# Patient Record
Sex: FEMALE | Race: WHITE | NOT HISPANIC OR LATINO | URBAN - METROPOLITAN AREA
[De-identification: names, ages, dates, MRNs, and addresses within clinical notes are randomized per-mention and may not be internally consistent; named-entity substitution may affect disease eponyms.]

---

## 2017-03-06 ENCOUNTER — OUTPATIENT (OUTPATIENT)
Dept: OUTPATIENT SERVICES | Facility: HOSPITAL | Age: 67
LOS: 1 days | End: 2017-03-06
Payer: COMMERCIAL

## 2017-03-06 DIAGNOSIS — Z22.321 CARRIER OR SUSPECTED CARRIER OF METHICILLIN SUSCEPTIBLE STAPHYLOCOCCUS AUREUS: ICD-10-CM

## 2017-03-06 PROCEDURE — 87641 MR-STAPH DNA AMP PROBE: CPT

## 2017-03-06 PROCEDURE — C1713: CPT

## 2017-03-06 PROCEDURE — C1889: CPT

## 2017-03-07 LAB
MRSA PCR RESULT.: NEGATIVE — SIGNIFICANT CHANGE UP
S AUREUS DNA NOSE QL NAA+PROBE: NEGATIVE — SIGNIFICANT CHANGE UP

## 2017-03-21 ENCOUNTER — RESULT REVIEW (OUTPATIENT)
Age: 67
End: 2017-03-21

## 2017-03-21 VITALS
HEIGHT: 65.5 IN | WEIGHT: 151.46 LBS | OXYGEN SATURATION: 97 % | TEMPERATURE: 97 F | RESPIRATION RATE: 20 BRPM | DIASTOLIC BLOOD PRESSURE: 72 MMHG | SYSTOLIC BLOOD PRESSURE: 108 MMHG | HEART RATE: 84 BPM

## 2017-03-22 ENCOUNTER — INPATIENT (INPATIENT)
Facility: HOSPITAL | Age: 67
LOS: 1 days | Discharge: HOME | DRG: 470 | End: 2017-03-24
Payer: COMMERCIAL

## 2017-03-22 DIAGNOSIS — M17.12 UNILATERAL PRIMARY OSTEOARTHRITIS, LEFT KNEE: ICD-10-CM

## 2017-03-22 DIAGNOSIS — Z90.89 ACQUIRED ABSENCE OF OTHER ORGANS: Chronic | ICD-10-CM

## 2017-03-22 PROCEDURE — 73560 X-RAY EXAM OF KNEE 1 OR 2: CPT | Mod: 26,LT

## 2017-03-22 RX ORDER — DOCUSATE SODIUM 100 MG
100 CAPSULE ORAL THREE TIMES A DAY
Qty: 0 | Refills: 0 | Status: DISCONTINUED | OUTPATIENT
Start: 2017-03-22 | End: 2017-03-24

## 2017-03-22 RX ORDER — MAGNESIUM HYDROXIDE 400 MG/1
30 TABLET, CHEWABLE ORAL DAILY
Qty: 0 | Refills: 0 | Status: DISCONTINUED | OUTPATIENT
Start: 2017-03-22 | End: 2017-03-24

## 2017-03-22 RX ORDER — KETOROLAC TROMETHAMINE 30 MG/ML
15 SYRINGE (ML) INJECTION EVERY 6 HOURS
Qty: 0 | Refills: 0 | Status: DISCONTINUED | OUTPATIENT
Start: 2017-03-22 | End: 2017-03-24

## 2017-03-22 RX ORDER — ACETAMINOPHEN 500 MG
650 TABLET ORAL EVERY 6 HOURS
Qty: 0 | Refills: 0 | Status: DISCONTINUED | OUTPATIENT
Start: 2017-03-22 | End: 2017-03-24

## 2017-03-22 RX ORDER — MONTELUKAST 4 MG/1
1 TABLET, CHEWABLE ORAL
Qty: 0 | Refills: 0 | COMMUNITY

## 2017-03-22 RX ORDER — CELECOXIB 200 MG/1
200 CAPSULE ORAL
Qty: 0 | Refills: 0 | Status: DISCONTINUED | OUTPATIENT
Start: 2017-03-22 | End: 2017-03-24

## 2017-03-22 RX ORDER — MORPHINE SULFATE 50 MG/1
4 CAPSULE, EXTENDED RELEASE ORAL
Qty: 0 | Refills: 0 | Status: DISCONTINUED | OUTPATIENT
Start: 2017-03-22 | End: 2017-03-22

## 2017-03-22 RX ORDER — OXYCODONE HYDROCHLORIDE 5 MG/1
5 TABLET ORAL EVERY 4 HOURS
Qty: 0 | Refills: 0 | Status: DISCONTINUED | OUTPATIENT
Start: 2017-03-22 | End: 2017-03-24

## 2017-03-22 RX ORDER — PANTOPRAZOLE SODIUM 20 MG/1
40 TABLET, DELAYED RELEASE ORAL DAILY
Qty: 0 | Refills: 0 | Status: DISCONTINUED | OUTPATIENT
Start: 2017-03-22 | End: 2017-03-24

## 2017-03-22 RX ORDER — POLYETHYLENE GLYCOL 3350 17 G/17G
17 POWDER, FOR SOLUTION ORAL DAILY
Qty: 0 | Refills: 0 | Status: DISCONTINUED | OUTPATIENT
Start: 2017-03-22 | End: 2017-03-24

## 2017-03-22 RX ORDER — ASPIRIN/CALCIUM CARB/MAGNESIUM 324 MG
325 TABLET ORAL
Qty: 0 | Refills: 0 | Status: DISCONTINUED | OUTPATIENT
Start: 2017-03-22 | End: 2017-03-24

## 2017-03-22 RX ORDER — SODIUM CHLORIDE 9 MG/ML
1000 INJECTION, SOLUTION INTRAVENOUS
Qty: 0 | Refills: 0 | Status: DISCONTINUED | OUTPATIENT
Start: 2017-03-22 | End: 2017-03-24

## 2017-03-22 RX ORDER — CEFAZOLIN SODIUM 1 G
2000 VIAL (EA) INJECTION EVERY 8 HOURS
Qty: 0 | Refills: 0 | Status: COMPLETED | OUTPATIENT
Start: 2017-03-22 | End: 2017-03-23

## 2017-03-22 RX ORDER — MORPHINE SULFATE 50 MG/1
4 CAPSULE, EXTENDED RELEASE ORAL EVERY 4 HOURS
Qty: 0 | Refills: 0 | Status: DISCONTINUED | OUTPATIENT
Start: 2017-03-22 | End: 2017-03-24

## 2017-03-22 RX ORDER — OMEPRAZOLE 10 MG/1
1 CAPSULE, DELAYED RELEASE ORAL
Qty: 0 | Refills: 0 | COMMUNITY

## 2017-03-22 RX ORDER — OXYCODONE HYDROCHLORIDE 5 MG/1
10 TABLET ORAL EVERY 12 HOURS
Qty: 0 | Refills: 0 | Status: DISCONTINUED | OUTPATIENT
Start: 2017-03-22 | End: 2017-03-24

## 2017-03-22 RX ORDER — OXYCODONE HYDROCHLORIDE 5 MG/1
10 TABLET ORAL EVERY 4 HOURS
Qty: 0 | Refills: 0 | Status: DISCONTINUED | OUTPATIENT
Start: 2017-03-22 | End: 2017-03-24

## 2017-03-22 RX ORDER — BUPIVACAINE 13.3 MG/ML
20 INJECTION, SUSPENSION, LIPOSOMAL INFILTRATION ONCE
Qty: 0 | Refills: 0 | Status: DISCONTINUED | OUTPATIENT
Start: 2017-03-22 | End: 2017-03-24

## 2017-03-22 RX ORDER — SENNA PLUS 8.6 MG/1
2 TABLET ORAL AT BEDTIME
Qty: 0 | Refills: 0 | Status: DISCONTINUED | OUTPATIENT
Start: 2017-03-22 | End: 2017-03-24

## 2017-03-22 RX ORDER — PANTOPRAZOLE SODIUM 20 MG/1
40 TABLET, DELAYED RELEASE ORAL
Qty: 0 | Refills: 0 | Status: DISCONTINUED | OUTPATIENT
Start: 2017-03-22 | End: 2017-03-24

## 2017-03-22 RX ORDER — ONDANSETRON 8 MG/1
4 TABLET, FILM COATED ORAL EVERY 6 HOURS
Qty: 0 | Refills: 0 | Status: DISCONTINUED | OUTPATIENT
Start: 2017-03-22 | End: 2017-03-24

## 2017-03-22 RX ADMIN — OXYCODONE HYDROCHLORIDE 10 MILLIGRAM(S): 5 TABLET ORAL at 23:40

## 2017-03-22 RX ADMIN — OXYCODONE HYDROCHLORIDE 10 MILLIGRAM(S): 5 TABLET ORAL at 17:00

## 2017-03-22 RX ADMIN — CELECOXIB 200 MILLIGRAM(S): 200 CAPSULE ORAL at 17:00

## 2017-03-22 RX ADMIN — CELECOXIB 200 MILLIGRAM(S): 200 CAPSULE ORAL at 16:09

## 2017-03-22 RX ADMIN — OXYCODONE HYDROCHLORIDE 10 MILLIGRAM(S): 5 TABLET ORAL at 16:06

## 2017-03-22 RX ADMIN — OXYCODONE HYDROCHLORIDE 5 MILLIGRAM(S): 5 TABLET ORAL at 13:32

## 2017-03-22 RX ADMIN — Medication 100 MILLIGRAM(S): at 16:10

## 2017-03-22 RX ADMIN — MORPHINE SULFATE 4 MILLIGRAM(S): 50 CAPSULE, EXTENDED RELEASE ORAL at 13:04

## 2017-03-22 RX ADMIN — PANTOPRAZOLE SODIUM 40 MILLIGRAM(S): 20 TABLET, DELAYED RELEASE ORAL at 16:06

## 2017-03-22 RX ADMIN — Medication 100 MILLIGRAM(S): at 22:44

## 2017-03-22 RX ADMIN — MORPHINE SULFATE 4 MILLIGRAM(S): 50 CAPSULE, EXTENDED RELEASE ORAL at 12:42

## 2017-03-22 RX ADMIN — MORPHINE SULFATE 4 MILLIGRAM(S): 50 CAPSULE, EXTENDED RELEASE ORAL at 13:50

## 2017-03-22 RX ADMIN — SODIUM CHLORIDE 100 MILLILITER(S): 9 INJECTION, SOLUTION INTRAVENOUS at 14:22

## 2017-03-22 RX ADMIN — OXYCODONE HYDROCHLORIDE 10 MILLIGRAM(S): 5 TABLET ORAL at 22:44

## 2017-03-22 RX ADMIN — Medication 325 MILLIGRAM(S): at 16:06

## 2017-03-22 NOTE — PHYSICAL THERAPY INITIAL EVALUATION ADULT - ACTIVE RANGE OF MOTION EXAMINATION, REHAB EVAL
left knee AROM 3-40/Right LE Active ROM was WFL (within functional limits)/Left UE Active ROM was WFL (within functional limits)/Right UE Active ROM was WFL (within functional limits)

## 2017-03-22 NOTE — H&P ADULT - PROBLEM SELECTOR PLAN 1
For left total knee replacement  admit to ortho  medically cleared by Dr. Jeffers For left total knee replacement  admit to ortho  medically cleared by Dr. Cabrera

## 2017-03-22 NOTE — H&P ADULT - NSHPPHYSICALEXAM_GEN_ALL_CORE
NAD   Left LE/knee:  ehl, tib ant, GS 5/5 b/l  gross sensation intact b/l LE  decreased rom secondary to pain  DP palpable b/l  calf, thigh soft, NT b/l      Rest of PE per medical clearance note.

## 2017-03-22 NOTE — H&P ADULT - HISTORY OF PRESENT ILLNESS
66 y.o female with left knee pain x chronic    Presents for elective left total knee replacement  Independent ambulator. No other complaints today.

## 2017-03-22 NOTE — PHYSICAL THERAPY INITIAL EVALUATION ADULT - GAIT DEVIATIONS NOTED, PT EVAL
decreased step length/decreased weight-shifting ability/decreased kamla/decreased velocity of limb motion

## 2017-03-22 NOTE — PHYSICAL THERAPY INITIAL EVALUATION ADULT - ASR EQUIP NEEDS DISCH PT EVAL
standard cane/voicemail left for [facility] in regards to equipment/rolling walker (5 inch wheels)

## 2017-03-22 NOTE — PHYSICAL THERAPY INITIAL EVALUATION ADULT - CRITERIA FOR SKILLED THERAPEUTIC INTERVENTIONS
functional limitations in following categories/predicted duration of therapy intervention/risk reduction/prevention/anticipated discharge recommendation/anticipated equipment needs at discharge/impairments found/rehab potential/therapy frequency

## 2017-03-22 NOTE — PHYSICAL THERAPY INITIAL EVALUATION ADULT - ADDITIONAL COMMENTS
Patient lives with her  in a house, 5 steps to enter. Patient reports she moved her bed to the first floor. Patient reports independence with all functional mobility without the use of an assistive device, however for the past week, she states she has been utilizing her 's cane secondary to increased L LE pain.

## 2017-03-22 NOTE — PROGRESS NOTE ADULT - SUBJECTIVE AND OBJECTIVE BOX
Orthopaedics Post Op Check Note    Procedure: Left total knee replacement  Surgeon: Dr. Simpson    Pt comfortable, without complaints. Pain controlled.  Denies CP, SOB, N/V, numbness/tingling     Vital Signs Last 24 Hrs  T(C): 36.6, Max: 36.6 (03-22 @ 14:39)  T(F): 97.8, Max: 97.8 (03-22 @ 14:39)  HR: 83 (83 - 83)  BP: 153/94 (153/94 - 153/94)  BP(mean): --  RR: 16 (16 - 16)  SpO2: 100% (100% - 100%)  Wt(kg): --  AVSS, NAD    Dressing C/D/I  General: Pt Alert and oriented     Pulses: DP palpable b/l  Sensation: gross sensation intact b/l LE  Motor: ehl, tib ant, GS 5/5 b/l   Abd:Soft, NT  calf, thigh soft, compressible      Post Op labs: AM labs      Post op XR: prosthesis intact    A/P: 66yFemale POD#0 s/p  Left total knee replacement  - Stable  - Pain Control  - DVT ppx: ASA, SCDs  - Alina op abx: ANCEF  - PT, WBS: WBAT  - F/U AM Labs  -IVF    Ortho Consult Pager: [phone]

## 2017-03-23 LAB — SURGICAL PATHOLOGY STUDY: SIGNIFICANT CHANGE UP

## 2017-03-23 RX ADMIN — Medication 100 MILLIGRAM(S): at 20:42

## 2017-03-23 RX ADMIN — OXYCODONE HYDROCHLORIDE 10 MILLIGRAM(S): 5 TABLET ORAL at 18:05

## 2017-03-23 RX ADMIN — CELECOXIB 200 MILLIGRAM(S): 200 CAPSULE ORAL at 09:12

## 2017-03-23 RX ADMIN — Medication 100 MILLIGRAM(S): at 13:40

## 2017-03-23 RX ADMIN — OXYCODONE HYDROCHLORIDE 10 MILLIGRAM(S): 5 TABLET ORAL at 08:10

## 2017-03-23 RX ADMIN — CELECOXIB 200 MILLIGRAM(S): 200 CAPSULE ORAL at 19:00

## 2017-03-23 RX ADMIN — Medication 650 MILLIGRAM(S): at 20:42

## 2017-03-23 RX ADMIN — CELECOXIB 200 MILLIGRAM(S): 200 CAPSULE ORAL at 10:02

## 2017-03-23 RX ADMIN — OXYCODONE HYDROCHLORIDE 10 MILLIGRAM(S): 5 TABLET ORAL at 06:07

## 2017-03-23 RX ADMIN — OXYCODONE HYDROCHLORIDE 10 MILLIGRAM(S): 5 TABLET ORAL at 07:35

## 2017-03-23 RX ADMIN — OXYCODONE HYDROCHLORIDE 10 MILLIGRAM(S): 5 TABLET ORAL at 05:20

## 2017-03-23 RX ADMIN — PANTOPRAZOLE SODIUM 40 MILLIGRAM(S): 20 TABLET, DELAYED RELEASE ORAL at 11:42

## 2017-03-23 RX ADMIN — OXYCODONE HYDROCHLORIDE 10 MILLIGRAM(S): 5 TABLET ORAL at 18:25

## 2017-03-23 RX ADMIN — OXYCODONE HYDROCHLORIDE 10 MILLIGRAM(S): 5 TABLET ORAL at 14:20

## 2017-03-23 RX ADMIN — Medication 325 MILLIGRAM(S): at 05:20

## 2017-03-23 RX ADMIN — OXYCODONE HYDROCHLORIDE 10 MILLIGRAM(S): 5 TABLET ORAL at 13:40

## 2017-03-23 RX ADMIN — PANTOPRAZOLE SODIUM 40 MILLIGRAM(S): 20 TABLET, DELAYED RELEASE ORAL at 07:34

## 2017-03-23 RX ADMIN — Medication 100 MILLIGRAM(S): at 00:06

## 2017-03-23 RX ADMIN — OXYCODONE HYDROCHLORIDE 10 MILLIGRAM(S): 5 TABLET ORAL at 19:00

## 2017-03-23 RX ADMIN — Medication 100 MILLIGRAM(S): at 05:20

## 2017-03-23 RX ADMIN — POLYETHYLENE GLYCOL 3350 17 GRAM(S): 17 POWDER, FOR SOLUTION ORAL at 13:40

## 2017-03-23 RX ADMIN — Medication 325 MILLIGRAM(S): at 18:25

## 2017-03-23 RX ADMIN — CELECOXIB 200 MILLIGRAM(S): 200 CAPSULE ORAL at 18:25

## 2017-03-23 RX ADMIN — OXYCODONE HYDROCHLORIDE 10 MILLIGRAM(S): 5 TABLET ORAL at 17:35

## 2017-03-23 NOTE — PROGRESS NOTE ADULT - SUBJECTIVE AND OBJECTIVE BOX
ORTHO NOTE    [x ] Pt seen/examined. 8 Am  [ ] Pt without any complaints/in NAD.    [ x] Pt complains of: Incisional pain with movements     [ ] ROS  otherwise negative    .    PHYSICAL EXAM:    Vital Signs Last 24 Hrs  T(C): 37.1, Max: 37.1 (03-23 @ 09:28)  T(F): 98.7, Max: 98.7 (03-23 @ 09:28)  HR: 72 (72 - 84)  BP: 107/53 (91/55 - 153/94)  BP(mean): --  RR: 16 (16 - 17)  SpO2: 95% (95% - 100%)    I&O's Detail  I & Os for 24h ending 23 Mar 2017 07:00  =============================================  IN:    lactated ringers.: 800 ml    Oral Fluid: 120 ml    Total IN: 920 ml  ---------------------------------------------  OUT:    Voided: 1500 ml    Total OUT: 1500 ml  ---------------------------------------------  Total NET: -580 ml    I & Os for current day (as of 23 Mar 2017 13:49)  =============================================  IN:    Oral Fluid: 600 ml    Total IN: 600 ml  ---------------------------------------------  OUT:    Voided: 600 ml    Total OUT: 600 ml  ---------------------------------------------  Total NET: 0 ml       CAPILLARY BLOOD GLUCOSE                  Neuro: A&0x3    Lungs: Denies SOB    CV: Denies chest pain    ABD: NON distended positive bowel sounds      Ext: NVID Dressing clean dry and intact.      LABS                        10.6   10.4  )-----------( 233      ( 23 Mar 2017 07:29 )             32.0                                23 Mar 2017 07:29    138    |  105    |  17     ----------------------------<  107    4.1     |  27     |  0.61     Ca    8.3        23 Mar 2017 07:29         ASSESSMENT/PLAN:      STATUS POST: Left TKR    CONTINUE:          [x ] PT WBAT    [x ] DVT PPX- ASA    x[ ] Pain MgtMEDICATIONS  (PRN):  acetaminophen   Tablet 650milliGRAM(s) Oral every 6 hours PRN For Temp greater than 38 C (100.4 F)  acetaminophen   Tablet. 650milliGRAM(s) Oral every 6 hours PRN Mild Pain (1 - 3)  oxyCODONE IR 5milliGRAM(s) Oral every 4 hours PRN Moderate Pain (4 - 6)  oxyCODONE IR 10milliGRAM(s) Oral every 4 hours PRN Severe Pain (7 - 10)  morphine  - Injectable 4milliGRAM(s) IV Push every 4 hours PRN Breakthrough pain  aluminum hydroxide/magnesium hydroxide/simethicone Suspension 30milliLiter(s) Oral four times a day PRN Indigestion  ondansetron Injectable 4milliGRAM(s) IV Push every 6 hours PRN Nausea and/or Vomiting  magnesium hydroxide Suspension 30milliLiter(s) Oral daily PRN Constipation  senna 2Tablet(s) Oral at bedtime PRN Constipation  ketorolac   Injectable 15milliGRAM(s) IV Push every 6 hours PRN breakthrough pain      [x ] Dispo plan- Home

## 2017-03-23 NOTE — DISCHARGE NOTE ADULT - NS AS ACTIVITY OBS
No Heavy lifting/straining Do not drive or operate machinery/Do not make important decisions/Showering allowed/No Heavy lifting/straining

## 2017-03-23 NOTE — DISCHARGE NOTE ADULT - MEDICATION SUMMARY - MEDICATIONS TO TAKE
I will START or STAY ON the medications listed below when I get home from the hospital:    meloxicam 15 mg oral tablet  -- 1 tab(s) by mouth once a day  -- Do not take this drug if you are pregnant.  Obtain medical advice before taking any non-prescription drugs as some may affect the action of this medication.  Take with food or milk.    -- Indication: For antiinflammatory/pain control    acetaminophen-oxyCODONE 325 mg-5 mg oral tablet  -- 1 to 2 tab(s) by mouth every 4 to 6 hours, as needed, pain MDD:8  -- Caution federal law prohibits the transfer of this drug to any person other  than the person for whom it was prescribed.  May cause drowsiness.  Alcohol may intensify this effect.  Use care when operating dangerous machinery.  This prescription cannot be refilled.  This product contains acetaminophen.  Do not use  with any other product containing acetaminophen to prevent possible liver damage.  Using more of this medication than prescribed may cause serious breathing problems.    -- Indication: For Moderate to severe pain    OxyCONTIN 10 mg oral tablet, extended release  -- 1 tab(s) by mouth every 12 hours MDD:2  -- Caution federal law prohibits the transfer of this drug to any person other  than the person for whom it was prescribed.  Check with your doctor before becoming pregnant.  Do not chew, break, or crush.  Do not drink alcoholic beverages when taking this medication.  May cause drowsiness.  Alcohol may intensify this effect.  Use care when operating dangerous machinery.  Obtain medical advice before taking any non-prescription drugs as some may affect the action of this medication.  Swallow whole.  Do not crush.  This drug may impair the ability to drive or operate machinery.  Use care until you become familiar with its effects.  This prescription cannot be refilled.  Using more of this medication than prescribed may cause serious breathing problems.    -- Indication: For longacting pain control    aspirin 325 mg oral delayed release tablet  -- 1 tab(s) by mouth 2 times a day. Take for 4 weeks after surgery to prevent blood clots  -- Indication: For Prevent blood clots    bisacodyl 10 mg rectal suppository  -- 1 suppository(ies) rectally once a day, As needed, If no bowel movement by postoperative day #2  -- Indication: For constipation    docusate sodium 100 mg oral capsule  -- 1 cap(s) by mouth 3 times a day  -- Indication: For constipation    polyethylene glycol 3350 oral powder for reconstitution  -- 17 gram(s) by mouth once a day  -- Indication: For constipation    senna oral tablet  -- 2 tab(s) by mouth once a day (at bedtime), As needed, Constipation  -- Indication: For constipation    Singulair 10 mg oral tablet  -- 1 tab(s) by mouth once a day  -- Indication: For respiratory agent    omeprazole 40 mg oral delayed release capsule  -- 1 cap(s) by mouth once a day  -- Indication: For GERD (gastroesophageal reflux disease)

## 2017-03-23 NOTE — DISCHARGE NOTE ADULT - CARE PLAN
Principal Discharge DX:	Primary osteoarthritis of left knee  Goal:	Pain Management, improve mobility  Instructions for follow-up, activity and diet:	see below Principal Discharge DX:	Primary osteoarthritis of left knee  Goal:	Pain Management, improve mobility after left total knee replacement 3/22/17  Instructions for follow-up, activity and diet:	see below

## 2017-03-23 NOTE — DISCHARGE NOTE ADULT - PATIENT PORTAL LINK FT
“You can access the FollowHealth Patient Portal, offered by [facility], by registering with the following website: [email or web address]”

## 2017-03-23 NOTE — DISCHARGE NOTE ADULT - ADDITIONAL INSTRUCTIONS
No strenuous activity, heavy lifting, driving, tub bathing, or returning to work until cleared by MD.  You may shower--dressing is waterproof.  Remove dressing after post op day 7, then leave incision open to air.  Follow up with Dr. Simpson call to schedule an appt within 10-14 days.  If you don't have a bowel movement by post op day 3, then take Milk of Magnesia (over the counter).  If no bowel movement by at least post op day 5, then use a Dulcolax suppository (over the counter) and/or a Fleets enema--if still no bowel movement, call your MD.  Contact your doctor if you experience: fever greater than 101.5, chills, chest pain, difficulty breathing, bleeding, redness or heat around the incision.     Please follow up with your primary care provider. Weight bearing as tolerated on left leg with assistive device (rolling walker).  No strenuous activity, heavy lifting, driving, tub bathing, or returning to work until cleared by MD.  You may shower--dressing is waterproof.  Remove dressing after post op day 7, then leave incision open to air.  Follow up with Dr. Simpson call to schedule an appt within 10-14 days.  If you don't have a bowel movement by post op day 3, then take Milk of Magnesia (over the counter).  If no bowel movement by at least post op day 5, then use a Dulcolax suppository (over the counter) and/or a Fleets enema--if still no bowel movement, call your MD.  Contact your doctor if you experience: fever greater than 101.5, chills, chest pain, difficulty breathing, bleeding, redness or heat around the incision.     Please follow up with your primary care provider.

## 2017-03-23 NOTE — PROGRESS NOTE ADULT - SUBJECTIVE AND OBJECTIVE BOX
POST OPERATIVE DAY #:  [1 ]   STATUS POST: [x ] Left [ ] Right  TKA                    SUBJECTIVE: Patient seen and examined. [ x   ]     Pain (0-10): controlled  Pain Management:  [ ] Pain Controlled Analgesia   [ ] Peripheral Nerve Block    OBJECTIVE:     Vital Signs Last 24 Hrs  T(C): 36.9, Max: 36.9 (03-23 @ 04:55)  T(F): 98.5, Max: 98.5 (03-23 @ 04:55)  HR: 84 (76 - 98)  BP: 105/56 (91/55 - 153/94)  BP(mean): --  RR: 17 (14 - 20)  SpO2: 95% (95% - 100%)    Affected extremity:          Dressing: [x ] clean/dry/intact  [ ] Other:           Sensation: [x ] intact to light touch  [ ] decreased:          Motor exam: [5  ] / 5 Tibialis Anterior/Gastrocnemius-Soleus          [ x] warm well perfused; capillary refill <3 seconds     LABS:                MEDICATIONS:lactated ringers. 1000milliLiter(s) IV Continuous <Continuous>  aspirin enteric coated 325milliGRAM(s) Oral two times a day  acetaminophen   Tablet 650milliGRAM(s) Oral every 6 hours PRN  acetaminophen   Tablet. 650milliGRAM(s) Oral every 6 hours PRN  oxyCODONE IR 5milliGRAM(s) Oral every 4 hours PRN  oxyCODONE IR 10milliGRAM(s) Oral every 4 hours PRN  morphine  - Injectable 4milliGRAM(s) IV Push every 4 hours PRN  aluminum hydroxide/magnesium hydroxide/simethicone Suspension 30milliLiter(s) Oral four times a day PRN  BUpivacaine liposome 1.3% Injectable (no eMAR) 20milliLiter(s) Local Injection once  ondansetron Injectable 4milliGRAM(s) IV Push every 6 hours PRN  pantoprazole    Tablet 40milliGRAM(s) Oral daily  magnesium hydroxide Suspension 30milliLiter(s) Oral daily PRN  polyethylene glycol 3350 17Gram(s) Oral daily  senna 2Tablet(s) Oral at bedtime PRN  docusate sodium 100milliGRAM(s) Oral three times a day  oxyCODONE ER Tablet 10milliGRAM(s) Oral every 12 hours  pantoprazole    Tablet 40milliGRAM(s) Oral before breakfast  celecoxib 200milliGRAM(s) Oral two times a day after meals  ketorolac   Injectable 15milliGRAM(s) IV Push every 6 hours PRN    Anticoagulation:  aspirin enteric coated 325milliGRAM(s) Oral two times a day      Antibiotics:       Pain medications:   acetaminophen   Tablet 650milliGRAM(s) Oral every 6 hours PRN  acetaminophen   Tablet. 650milliGRAM(s) Oral every 6 hours PRN  oxyCODONE IR 5milliGRAM(s) Oral every 4 hours PRN  oxyCODONE IR 10milliGRAM(s) Oral every 4 hours PRN  morphine  - Injectable 4milliGRAM(s) IV Push every 4 hours PRN  ondansetron Injectable 4milliGRAM(s) IV Push every 6 hours PRN  oxyCODONE ER Tablet 10milliGRAM(s) Oral every 12 hours  celecoxib 200milliGRAM(s) Oral two times a day after meals  ketorolac   Injectable 15milliGRAM(s) IV Push every 6 hours PRN      RADIOLOGY & ADDITIONAL STUDIES:    A/P : L TKR  -    Pain control  -    DVT ppx: ASA81 [ ] [id number] [ x] Lovenox [ ] Coumadin [ ] Heparin  [ ] Eliquis [ ] Xarelto [ ]   -    Periop abx:  Ancef [x ]  Vanco [ ]  Zosyn [ ] Rifampin [ ] Cipro [ ] Bactrim [ ] Ceftriaxone [ ]  Clinda [ ]  Metronidazole [ ]  -    ADAT  -    Check AM labs  -    Weight bearing status: WBAT [x ]        PWB    [ ]     TTWB  [ ]      NWB  [ ]  -    Resume home meds  -    Physical Therapy  -    Dispo: Home [ ]     Rehab [ ]      SHANTEL [ ]      To be determined [ x]

## 2017-03-23 NOTE — DISCHARGE NOTE ADULT - CARE PROVIDER_API CALL
Luis Simpson), Orthopaedic Surgery  [address] [address]  Phone: [phone]  Fax: [phone]

## 2017-03-23 NOTE — DISCHARGE NOTE ADULT - PLAN OF CARE
Pain Management, improve mobility see below Pain Management, improve mobility after left total knee replacement 3/22/17

## 2017-03-23 NOTE — DISCHARGE NOTE ADULT - HOSPITAL COURSE
Admit 3/22/17  Surgery S/P Left TKR  Pre/post-op Antibiotics  DVT prophylaxis  Physical Therapy  Pain Management

## 2017-03-23 NOTE — DISCHARGE NOTE ADULT - HOME CARE AGENCY
Homecare referrals sent to agencies in patient home area  acceptance pending  Patient and the NP are aware  Patient was given an Rx for out Patient PT to be used if there is no accepting homecare agency

## 2017-03-24 VITALS — HEART RATE: 84 BPM | DIASTOLIC BLOOD PRESSURE: 76 MMHG | OXYGEN SATURATION: 98 % | SYSTOLIC BLOOD PRESSURE: 120 MMHG

## 2017-03-24 PROCEDURE — 97116 GAIT TRAINING THERAPY: CPT

## 2017-03-24 PROCEDURE — 88300 SURGICAL PATH GROSS: CPT

## 2017-03-24 PROCEDURE — C1776: CPT

## 2017-03-24 PROCEDURE — 80048 BASIC METABOLIC PNL TOTAL CA: CPT

## 2017-03-24 PROCEDURE — 85027 COMPLETE CBC AUTOMATED: CPT

## 2017-03-24 PROCEDURE — C1713: CPT

## 2017-03-24 PROCEDURE — 73560 X-RAY EXAM OF KNEE 1 OR 2: CPT

## 2017-03-24 PROCEDURE — 97161 PT EVAL LOW COMPLEX 20 MIN: CPT

## 2017-03-24 PROCEDURE — 36415 COLL VENOUS BLD VENIPUNCTURE: CPT

## 2017-03-24 RX ORDER — POLYETHYLENE GLYCOL 3350 17 G/17G
17 POWDER, FOR SOLUTION ORAL
Qty: 0 | Refills: 0 | COMMUNITY
Start: 2017-03-24

## 2017-03-24 RX ORDER — ASPIRIN/CALCIUM CARB/MAGNESIUM 324 MG
1 TABLET ORAL
Qty: 0 | Refills: 0 | COMMUNITY
Start: 2017-03-24

## 2017-03-24 RX ORDER — MELOXICAM 15 MG/1
1 TABLET ORAL
Qty: 0 | Refills: 0 | COMMUNITY

## 2017-03-24 RX ORDER — OXYCODONE HYDROCHLORIDE 5 MG/1
2 TABLET ORAL
Qty: 56 | Refills: 0 | OUTPATIENT
Start: 2017-03-24 | End: 2017-03-31

## 2017-03-24 RX ORDER — OXYCODONE HYDROCHLORIDE 5 MG/1
1 TABLET ORAL
Qty: 14 | Refills: 0 | OUTPATIENT
Start: 2017-03-24 | End: 2017-03-31

## 2017-03-24 RX ORDER — DOCUSATE SODIUM 100 MG
1 CAPSULE ORAL
Qty: 0 | Refills: 0 | COMMUNITY
Start: 2017-03-24

## 2017-03-24 RX ORDER — MELOXICAM 15 MG/1
1 TABLET ORAL
Qty: 30 | Refills: 0 | OUTPATIENT
Start: 2017-03-24 | End: 2017-04-23

## 2017-03-24 RX ORDER — SENNA PLUS 8.6 MG/1
2 TABLET ORAL
Qty: 0 | Refills: 0 | COMMUNITY
Start: 2017-03-24

## 2017-03-24 RX ADMIN — CELECOXIB 200 MILLIGRAM(S): 200 CAPSULE ORAL at 11:30

## 2017-03-24 RX ADMIN — CELECOXIB 200 MILLIGRAM(S): 200 CAPSULE ORAL at 11:04

## 2017-03-24 RX ADMIN — Medication 100 MILLIGRAM(S): at 05:27

## 2017-03-24 RX ADMIN — Medication 325 MILLIGRAM(S): at 05:27

## 2017-03-24 RX ADMIN — OXYCODONE HYDROCHLORIDE 10 MILLIGRAM(S): 5 TABLET ORAL at 05:27

## 2017-03-24 RX ADMIN — PANTOPRAZOLE SODIUM 40 MILLIGRAM(S): 20 TABLET, DELAYED RELEASE ORAL at 12:02

## 2017-03-24 RX ADMIN — OXYCODONE HYDROCHLORIDE 10 MILLIGRAM(S): 5 TABLET ORAL at 06:00

## 2017-03-24 RX ADMIN — PANTOPRAZOLE SODIUM 40 MILLIGRAM(S): 20 TABLET, DELAYED RELEASE ORAL at 05:27

## 2017-03-24 NOTE — PROGRESS NOTE ADULT - SUBJECTIVE AND OBJECTIVE BOX
POST OPERATIVE DAY #:  [2 ]   STATUS POST: [x ] Left [ ] Right  TKA                    SUBJECTIVE: Patient seen and examined. [ x   ]     Pain (0-10): controlled  Pain Management:  [ ] Pain Controlled Analgesia   [ ] Peripheral Nerve Block    OBJECTIVE:     Vital Signs Last 24 Hrs  T(C): 37.2, Max: 38.7 (03-23 @ 20:22)  T(F): 98.9, Max: 101.7 (03-23 @ 20:22)  HR: 97 (72 - 100)  BP: 106/67 (106/67 - 149/79)  BP(mean): --  RR: 15 (15 - 18)  SpO2: 97% (95% - 97%)    Affected extremity:          Dressing: [x ] clean/dry/intact  [ ] Other:           Sensation: [x ] intact to light touch  [ ] decreased:          Motor exam: [5  ] / 5 Tibialis Anterior/Gastrocnemius-Soleus          [ x] warm well perfused; capillary refill <3 seconds     LABS:                MEDICATIONS:lactated ringers. 1000milliLiter(s) IV Continuous <Continuous>  aspirin enteric coated 325milliGRAM(s) Oral two times a day  acetaminophen   Tablet 650milliGRAM(s) Oral every 6 hours PRN  acetaminophen   Tablet. 650milliGRAM(s) Oral every 6 hours PRN  oxyCODONE IR 5milliGRAM(s) Oral every 4 hours PRN  oxyCODONE IR 10milliGRAM(s) Oral every 4 hours PRN  morphine  - Injectable 4milliGRAM(s) IV Push every 4 hours PRN  aluminum hydroxide/magnesium hydroxide/simethicone Suspension 30milliLiter(s) Oral four times a day PRN  BUpivacaine liposome 1.3% Injectable (no eMAR) 20milliLiter(s) Local Injection once  ondansetron Injectable 4milliGRAM(s) IV Push every 6 hours PRN  pantoprazole    Tablet 40milliGRAM(s) Oral daily  magnesium hydroxide Suspension 30milliLiter(s) Oral daily PRN  polyethylene glycol 3350 17Gram(s) Oral daily  senna 2Tablet(s) Oral at bedtime PRN  docusate sodium 100milliGRAM(s) Oral three times a day  oxyCODONE ER Tablet 10milliGRAM(s) Oral every 12 hours  pantoprazole    Tablet 40milliGRAM(s) Oral before breakfast  celecoxib 200milliGRAM(s) Oral two times a day after meals  ketorolac   Injectable 15milliGRAM(s) IV Push every 6 hours PRN    Anticoagulation:  aspirin enteric coated 325milliGRAM(s) Oral two times a day      Antibiotics:       Pain medications:   acetaminophen   Tablet 650milliGRAM(s) Oral every 6 hours PRN  acetaminophen   Tablet. 650milliGRAM(s) Oral every 6 hours PRN  oxyCODONE IR 5milliGRAM(s) Oral every 4 hours PRN  oxyCODONE IR 10milliGRAM(s) Oral every 4 hours PRN  morphine  - Injectable 4milliGRAM(s) IV Push every 4 hours PRN  ondansetron Injectable 4milliGRAM(s) IV Push every 6 hours PRN  oxyCODONE ER Tablet 10milliGRAM(s) Oral every 12 hours  celecoxib 200milliGRAM(s) Oral two times a day after meals  ketorolac   Injectable 15milliGRAM(s) IV Push every 6 hours PRN      RADIOLOGY & ADDITIONAL STUDIES:    A/P : L TKR  -    Pain control  -    DVT ppx: ASA81 [ ] [id number] [ x] Lovenox [ ] Coumadin [ ] Heparin  [ ] Eliquis [ ] Xarelto [ ]   -    Periop abx:  Ancef [x ]  Vanco [ ]  Zosyn [ ] Rifampin [ ] Cipro [ ] Bactrim [ ] Ceftriaxone [ ]  Clinda [ ]  Metronidazole [ ]  -    ADAT  -    Check AM labs  -    Weight bearing status: WBAT [x ]        PWB    [ ]     TTWB  [ ]      NWB  [ ]  -    Resume home meds  -    Physical Therapy  -    Dispo: Home [x ]     Rehab [ ]      SHANTEL [ ]      To be determined [ ]

## 2017-03-27 ENCOUNTER — APPOINTMENT (OUTPATIENT)
Dept: ORTHOPEDIC SURGERY | Facility: CLINIC | Age: 67
End: 2017-03-27

## 2017-03-27 DIAGNOSIS — M17.12 UNILATERAL PRIMARY OSTEOARTHRITIS, LEFT KNEE: ICD-10-CM

## 2017-03-27 DIAGNOSIS — M41.9 SCOLIOSIS, UNSPECIFIED: ICD-10-CM

## 2017-03-27 DIAGNOSIS — K21.9 GASTRO-ESOPHAGEAL REFLUX DISEASE WITHOUT ESOPHAGITIS: ICD-10-CM

## 2017-08-24 ENCOUNTER — APPOINTMENT (OUTPATIENT)
Dept: CARDIOLOGY | Facility: CLINIC | Age: 67
End: 2017-08-24

## 2017-09-12 PROBLEM — M41.9 SCOLIOSIS, UNSPECIFIED: Chronic | Status: ACTIVE | Noted: 2017-03-21

## 2017-09-12 PROBLEM — K21.9 GASTRO-ESOPHAGEAL REFLUX DISEASE WITHOUT ESOPHAGITIS: Chronic | Status: ACTIVE | Noted: 2017-03-21

## 2017-09-14 ENCOUNTER — TRANSCRIPTION ENCOUNTER (OUTPATIENT)
Age: 67
End: 2017-09-14

## 2017-09-18 ENCOUNTER — TRANSCRIPTION ENCOUNTER (OUTPATIENT)
Age: 67
End: 2017-09-18

## 2017-10-02 ENCOUNTER — APPOINTMENT (OUTPATIENT)
Dept: CARDIOLOGY | Facility: CLINIC | Age: 67
End: 2017-10-02
Payer: COMMERCIAL

## 2017-10-02 PROCEDURE — 93000 ELECTROCARDIOGRAM COMPLETE: CPT

## 2017-10-02 PROCEDURE — 99214 OFFICE O/P EST MOD 30 MIN: CPT

## 2018-10-05 ENCOUNTER — APPOINTMENT (OUTPATIENT)
Dept: CARDIOLOGY | Facility: CLINIC | Age: 68
End: 2018-10-05

## 2018-10-23 ENCOUNTER — TRANSCRIPTION ENCOUNTER (OUTPATIENT)
Age: 68
End: 2018-10-23

## 2018-10-23 ENCOUNTER — APPOINTMENT (OUTPATIENT)
Dept: CARDIOLOGY | Facility: CLINIC | Age: 68
End: 2018-10-23
Payer: COMMERCIAL

## 2018-10-23 VITALS — SYSTOLIC BLOOD PRESSURE: 110 MMHG | OXYGEN SATURATION: 97 % | DIASTOLIC BLOOD PRESSURE: 70 MMHG | HEART RATE: 75 BPM

## 2018-10-23 PROCEDURE — 99214 OFFICE O/P EST MOD 30 MIN: CPT

## 2018-10-23 RX ORDER — MONTELUKAST 10 MG/1
10 TABLET, FILM COATED ORAL DAILY
Refills: 0 | Status: ACTIVE | COMMUNITY

## 2018-10-23 RX ORDER — ATORVASTATIN CALCIUM 40 MG/1
40 TABLET, FILM COATED ORAL DAILY
Refills: 0 | Status: ACTIVE | COMMUNITY

## 2018-10-27 ENCOUNTER — TRANSCRIPTION ENCOUNTER (OUTPATIENT)
Age: 68
End: 2018-10-27

## 2019-11-05 ENCOUNTER — NON-APPOINTMENT (OUTPATIENT)
Age: 69
End: 2019-11-05

## 2019-11-05 ENCOUNTER — APPOINTMENT (OUTPATIENT)
Dept: CARDIOLOGY | Facility: CLINIC | Age: 69
End: 2019-11-05
Payer: MEDICARE

## 2019-11-05 VITALS
WEIGHT: 140 LBS | BODY MASS INDEX: 23.32 KG/M2 | HEIGHT: 65 IN | DIASTOLIC BLOOD PRESSURE: 70 MMHG | SYSTOLIC BLOOD PRESSURE: 132 MMHG | OXYGEN SATURATION: 97 % | HEART RATE: 65 BPM

## 2019-11-05 PROCEDURE — 99214 OFFICE O/P EST MOD 30 MIN: CPT

## 2019-11-05 PROCEDURE — 93000 ELECTROCARDIOGRAM COMPLETE: CPT

## 2019-11-05 NOTE — PHYSICAL EXAM
[General Appearance - Well Developed] : well developed [Normal Appearance] : normal appearance [Well Groomed] : well groomed [General Appearance - Well Nourished] : well nourished [No Deformities] : no deformities [General Appearance - In No Acute Distress] : no acute distress [Normal Conjunctiva] : the conjunctiva exhibited no abnormalities [Eyelids - No Xanthelasma] : the eyelids demonstrated no xanthelasmas [Normal Oral Mucosa] : normal oral mucosa [No Oral Pallor] : no oral pallor [No Oral Cyanosis] : no oral cyanosis [Normal Jugular Venous A Waves Present] : normal jugular venous A waves present [Normal Jugular Venous V Waves Present] : normal jugular venous V waves present [No Jugular Venous Llamas A Waves] : no jugular venous llamas A waves [Respiration, Rhythm And Depth] : normal respiratory rhythm and effort [Exaggerated Use Of Accessory Muscles For Inspiration] : no accessory muscle use [Auscultation Breath Sounds / Voice Sounds] : lungs were clear to auscultation bilaterally [Heart Rate And Rhythm] : heart rate and rhythm were normal [Heart Sounds] : normal S1 and S2 [Murmurs] : no murmurs present [Abdomen Soft] : soft [Abdomen Tenderness] : non-tender [Abdomen Mass (___ Cm)] : no abdominal mass palpated [Abnormal Walk] : normal gait [Gait - Sufficient For Exercise Testing] : the gait was sufficient for exercise testing [Nail Clubbing] : no clubbing of the fingernails [Cyanosis, Localized] : no localized cyanosis [Petechial Hemorrhages (___cm)] : no petechial hemorrhages [Skin Color & Pigmentation] : normal skin color and pigmentation [] : no rash [No Venous Stasis] : no venous stasis [Skin Lesions] : no skin lesions [No Skin Ulcers] : no skin ulcer [No Xanthoma] : no  xanthoma was observed [Oriented To Time, Place, And Person] : oriented to person, place, and time [Affect] : the affect was normal [Mood] : the mood was normal [No Anxiety] : not feeling anxious

## 2019-11-05 NOTE — HISTORY OF PRESENT ILLNESS
[FreeTextEntry1] : The patient is presenting here today for yearly cardiac followup visit. The patient is 69-year-old female with a history of hyperlipidemia. She was recently started on statin. Recent blood work was reviewed. EKG from August 2018 was reviewed. Normal sinus rhythm. Patient recently retired. Started exercising on a regular basis. She goes to [facility] for times a week. She denies any symptoms of any chest pains or shortness of breath.

## 2019-11-05 NOTE — ASSESSMENT
[FreeTextEntry1] : Hyperlipidemia will control.  EKG  reviewed. No change.  Patient is clinically doing very well. Patient is physically very active. Patient has no symptoms of any chest pains or shortness of breath. Continue primary prevention.\par Cardiac followup yearly and as needed.

## 2020-11-10 ENCOUNTER — APPOINTMENT (OUTPATIENT)
Dept: CARDIOLOGY | Facility: CLINIC | Age: 70
End: 2020-11-10

## 2022-01-11 NOTE — PHYSICAL THERAPY INITIAL EVALUATION ADULT - LEVEL OF INDEPENDENCE: GAIT, REHAB EVAL
Received fax from pt pharmacy requesting 90 day supply of Metformin due to insurance     Requested Prescriptions     Pending Prescriptions Disp Refills   • metFORMIN 500 MG Oral Tab 60 tablet 0     Sig: Take 1 tablet (500 mg total) by mouth 2 (two) times d
contact guard

## 2022-09-16 NOTE — PATIENT PROFILE ADULT. - TEACHING/LEARNING FACTORS INFLUENCE READINESS TO LEARN
Message has been sent to provider as she sent a message through [employer].   
PATIENT CALLED IN REGARDS TO A MY CHART MESSAGE. SHE HAS QUESTIONS ABOUT A MEDICATION    PLEASE CALL AND ADVISE [phone]  
none

## 2022-11-08 ENCOUNTER — NON-APPOINTMENT (OUTPATIENT)
Age: 72
End: 2022-11-08

## 2022-11-11 ENCOUNTER — NON-APPOINTMENT (OUTPATIENT)
Age: 72
End: 2022-11-11

## 2022-11-11 ENCOUNTER — APPOINTMENT (OUTPATIENT)
Dept: CARDIOLOGY | Facility: CLINIC | Age: 72
End: 2022-11-11

## 2022-11-11 VITALS
HEART RATE: 66 BPM | BODY MASS INDEX: 23.16 KG/M2 | TEMPERATURE: 97.3 F | WEIGHT: 139 LBS | OXYGEN SATURATION: 98 % | HEIGHT: 65 IN | DIASTOLIC BLOOD PRESSURE: 66 MMHG | SYSTOLIC BLOOD PRESSURE: 120 MMHG

## 2022-11-11 PROCEDURE — 99214 OFFICE O/P EST MOD 30 MIN: CPT | Mod: 25

## 2022-11-11 PROCEDURE — 93000 ELECTROCARDIOGRAM COMPLETE: CPT

## 2022-11-11 RX ORDER — OMEPRAZOLE 40 MG/1
40 CAPSULE, DELAYED RELEASE ORAL
Refills: 0 | Status: DISCONTINUED | COMMUNITY
End: 2022-11-11

## 2022-11-11 NOTE — PHYSICAL EXAM
[Normal] : no edema, no cyanosis, no clubbing, no varicosities [General Appearance - Well Developed] : well developed [Normal Appearance] : normal appearance [Well Groomed] : well groomed [General Appearance - Well Nourished] : well nourished [No Deformities] : no deformities [General Appearance - In No Acute Distress] : no acute distress [Normal Conjunctiva] : the conjunctiva exhibited no abnormalities [Eyelids - No Xanthelasma] : the eyelids demonstrated no xanthelasmas [Normal Oral Mucosa] : normal oral mucosa [No Oral Pallor] : no oral pallor [No Oral Cyanosis] : no oral cyanosis [Normal Jugular Venous A Waves Present] : normal jugular venous A waves present [Normal Jugular Venous V Waves Present] : normal jugular venous V waves present [No Jugular Venous Llamas A Waves] : no jugular venous llamas A waves [Respiration, Rhythm And Depth] : normal respiratory rhythm and effort [Exaggerated Use Of Accessory Muscles For Inspiration] : no accessory muscle use [Auscultation Breath Sounds / Voice Sounds] : lungs were clear to auscultation bilaterally [Heart Rate And Rhythm] : heart rate and rhythm were normal [Heart Sounds] : normal S1 and S2 [Murmurs] : no murmurs present [Abdomen Soft] : soft [Abdomen Tenderness] : non-tender [Abdomen Mass (___ Cm)] : no abdominal mass palpated [Abnormal Walk] : normal gait [Gait - Sufficient For Exercise Testing] : the gait was sufficient for exercise testing [Nail Clubbing] : no clubbing of the fingernails [Cyanosis, Localized] : no localized cyanosis [Petechial Hemorrhages (___cm)] : no petechial hemorrhages [Skin Color & Pigmentation] : normal skin color and pigmentation [] : no rash [No Venous Stasis] : no venous stasis [Skin Lesions] : no skin lesions [No Skin Ulcers] : no skin ulcer [No Xanthoma] : no  xanthoma was observed [Oriented To Time, Place, And Person] : oriented to person, place, and time [Affect] : the affect was normal [Mood] : the mood was normal [No Anxiety] : not feeling anxious

## 2022-11-11 NOTE — HISTORY OF PRESENT ILLNESS
[FreeTextEntry1] : The patient is presenting here today for yearly cardiac followup visit. The patient is 72-year-old female with a history of hyperlipidemia. She was recently started on statin. Recent blood work was reviewed. EKG from August 2018 was reviewed. Normal sinus rhythm. Patient recently retired. Started exercising on a regular basis. She goes to [facility] for times a week. She denies any symptoms of any chest pains or shortness of breath.\par Patient is physically very active.  No exertional symptoms.  Her ECG was done recently and was found to be abnormal.  Poor R wave progression in precordial leads.  No history of CAD or symptoms of CAD.

## 2022-11-11 NOTE — ASSESSMENT
[FreeTextEntry1] : Hyperlipidemia will control.  EKG  reviewed. No change.  Patient is clinically doing very well. Patient is physically very active. Patient has no symptoms of any chest pains or shortness of breath.  Abnormal ECG.  Poor R wave progression.  We will schedule echocardiogram to rule out any segmental wall motion abnormalities.  Slight reduction of carotid upstrokes.  Carotid ultrasound will be done to rule out any carotid disease.  Follow-up after echocardiogram and carotid ultrasound.  If there is any segmental wall motion abnormality on echocardiogram we will consider stress test.  Continue primary prevention.\par Cardiac followup after testing.

## 2022-11-15 ENCOUNTER — NON-APPOINTMENT (OUTPATIENT)
Age: 72
End: 2022-11-15

## 2022-11-16 ENCOUNTER — APPOINTMENT (OUTPATIENT)
Dept: CARDIOLOGY | Facility: CLINIC | Age: 72
End: 2022-11-16

## 2022-11-16 VITALS
WEIGHT: 143 LBS | HEART RATE: 89 BPM | OXYGEN SATURATION: 97 % | HEIGHT: 65 IN | TEMPERATURE: 97.3 F | BODY MASS INDEX: 23.82 KG/M2 | SYSTOLIC BLOOD PRESSURE: 136 MMHG | DIASTOLIC BLOOD PRESSURE: 66 MMHG

## 2022-11-16 PROCEDURE — 93306 TTE W/DOPPLER COMPLETE: CPT

## 2022-11-16 PROCEDURE — 99215 OFFICE O/P EST HI 40 MIN: CPT

## 2022-11-16 NOTE — ASSESSMENT
[FreeTextEntry1] : Hyperlipidemia will control.  EKG  reviewed. No change.  Patient is clinically doing very well. Patient is physically very active. Patient has no symptoms of any chest pains or shortness of breath.  Abnormal ECG.  Poor R wave progression.  We will schedule echocardiogram to rule out any segmental wall motion abnormalities.  Echocardiogram was done today.  Results reviewed.  No segmental wall motion abnormalities.  Normal ejection fraction.  She continues to have left shoulder discomfort.  Intermittent.  Nonexertional.  Abnormal ECG with nonspecific ST and T wave changes.  I recommend further testing to rule out any cardiac ischemia causing the symptoms.  Since her ECG is abnormal ETT will be nondiagnostic.  The best option will be imaging nuclear stress test.  Follow-up after nuclear stress test.

## 2022-11-16 NOTE — HISTORY OF PRESENT ILLNESS
[FreeTextEntry1] : The patient is presenting here today for yearly cardiac followup visit. The patient is 72-year-old female with a history of hyperlipidemia. She was recently started on statin. Recent blood work was reviewed. EKG from August 2018 was reviewed. Normal sinus rhythm. Patient recently retired. Started exercising on a regular basis. She goes to [facility] for times a week. She denies any symptoms of any chest pains or shortness of breath.\par Patient is physically very active.  No exertional symptoms.  Her ECG was done recently and was found to be abnormal.  Poor R wave progression in precordial leads.  No history of CAD or symptoms of CAD.\par She continues to have discomfort in her left shoulder area.  It is intermittent.  She is physically very active.  She denies any exertional chest pains.

## 2022-12-06 ENCOUNTER — APPOINTMENT (OUTPATIENT)
Dept: CARDIOLOGY | Facility: CLINIC | Age: 72
End: 2022-12-06

## 2022-12-06 PROBLEM — R07.89 CHEST PAIN, ATYPICAL: Status: ACTIVE | Noted: 2022-11-16

## 2022-12-06 PROBLEM — E78.01 FAMILIAL HYPERCHOLESTEROLEMIA: Status: ACTIVE | Noted: 2018-10-23

## 2022-12-06 PROCEDURE — 93015 CV STRESS TEST SUPVJ I&R: CPT

## 2022-12-06 PROCEDURE — A9502: CPT

## 2022-12-06 PROCEDURE — 78452 HT MUSCLE IMAGE SPECT MULT: CPT

## 2022-12-07 ENCOUNTER — APPOINTMENT (OUTPATIENT)
Dept: CARDIOLOGY | Facility: CLINIC | Age: 72
End: 2022-12-07

## 2022-12-07 VITALS
BODY MASS INDEX: 23.32 KG/M2 | HEART RATE: 71 BPM | OXYGEN SATURATION: 96 % | TEMPERATURE: 98.5 F | DIASTOLIC BLOOD PRESSURE: 60 MMHG | HEIGHT: 65 IN | SYSTOLIC BLOOD PRESSURE: 110 MMHG | WEIGHT: 140 LBS

## 2022-12-07 DIAGNOSIS — R07.89 OTHER CHEST PAIN: ICD-10-CM

## 2022-12-07 DIAGNOSIS — E78.01 FAMILIAL HYPERCHOLESTEROLEMIA: ICD-10-CM

## 2022-12-07 PROCEDURE — 99214 OFFICE O/P EST MOD 30 MIN: CPT

## 2022-12-07 NOTE — HISTORY OF PRESENT ILLNESS
[FreeTextEntry1] : The patient is presenting here today for yearly cardiac followup visit. The patient is 72-year-old female with a history of hyperlipidemia. She was recently started on statin. Recent blood work was reviewed. EKG from August 2018 was reviewed. Normal sinus rhythm. Patient recently retired. Started exercising on a regular basis. She goes to [facility] for times a week. She denies any symptoms of any chest pains or shortness of breath.\par Patient is physically very active.  No exertional symptoms.  Her ECG was done recently and was found to be abnormal.  Poor R wave progression in precordial leads.  No history of CAD or symptoms of CAD.\par

## 2022-12-07 NOTE — ASSESSMENT
[FreeTextEntry1] : Hyperlipidemia will control.  EKG  reviewed. No change.  Patient is clinically doing very well. Patient is physically very active. Patient has no symptoms of any chest pains or shortness of breath.  Abnormal ECG.  Poor R wave progression.  Echocardiogram was reviewed.  No segmental wall motion abnormalities noted.  Nuclear stress test reviewed.  No evidence of any significant ischemia.  Patient is asymptomatic.  Continue primary prevention.  Cardiac follow-up yearly and as needed.

## 2022-12-17 NOTE — DISCHARGE NOTE ADULT - CLICK TO LAUNCH ORM
[facility] H&P      History Of Present Illness  This is a 76 year old year old male who was admitted for Skin cancer of nose.   He recently underwent Mohs procedure to remove the cancer.  He is s/p Surgical debridement of the wound bed,   left-sided paramedian forehead flap for reconstruction of the left ala for both internal and external lining, and adjacent tissue transfer of the forehead for closure of the donor site defect measuring 2 x 10 cm.  Patient doing well this AM with no complaints.       Past Medical History  Past Medical History:   Diagnosis Date   • Arthritis    • Asthma     mild, no rx   • Basal cell carcinoma of skin     nose, s/p excision of lesion 12/9/22   • Diabetes mellitus (CMS/HCC)    • Essential (primary) hypertension    • High cholesterol    • Neuropathy    • Shortness of breath     w/activity, monitored by pcp        Surgical History  Past Surgical History:   Procedure Laterality Date   • Appendectomy     • Back surgery      lumbar discectomy   • Eye surgery Bilateral     cataracts   • Removal gallbladder  2000   • Skin lesion excision  12/09/2022    nasal basal cell carcinoma   • Tonsillectomy          Social History  Social History     Tobacco Use   • Smoking status: Every Day     Packs/day: 2.00     Types: Cigarettes   • Smokeless tobacco: Never   Vaping Use   • Vaping Use: never used   Substance Use Topics   • Alcohol use: Not Currently     Comment: one drink rarely   • Drug use: Never       Family History  History reviewed. No pertinent family history.     Allergies  ALLERGIES:  Seasonal    Medications  Medications Prior to Admission   Medication Sig Dispense Refill   • metFORMIN (GLUCOPHAGE) 500 MG tablet Take 500 mg by mouth in the morning and 500 mg in the evening. Take with meals.     • fenofibrate (TRICOR) 145 MG tablet Take 145 mg by mouth daily.     • losartan-hydrochlorothiazide (HYZAAR) 50-12.5 MG per tablet Take 1 tablet  by mouth daily.     • metoPROLOL succinate (TOPROL-XL) 50 MG 24 hr tablet Take 50 mg by mouth daily.     • NIFEdipine CC (ADALAT CC) 90 MG 24 hr tablet Take 90 mg by mouth daily.     • spironolactone (ALDACTONE) 25 MG tablet Take 25 mg by mouth daily.     • aspirin 81 MG chewable tablet Chew 81 mg by mouth daily.     • atorvastatin (LIPITOR) 10 MG tablet Take 10 mg by mouth at bedtime.     • gabapentin (NEURONTIN) 300 MG capsule Take 300 mg by mouth in the morning and 300 mg in the evening.     • dapagliflozin (FARXIGA) 5 MG tablet Take 5 mg by mouth daily.     • glimepiride (AMARYL) 2 MG tablet Take 2 mg by mouth daily (before breakfast).     • dulaglutide (Trulicity) 1.5 MG/0.5ML pen-injector Inject 1.5 mg into the skin every 7 days. Takes on Friday       Current Facility-Administered Medications   Medication Dose Route Frequency Provider Last Rate Last Admin   • acetaminophen (TYLENOL) tablet 1,000 mg  1,000 mg Oral 3 times per day Israel Gomez MD   1,000 mg at 12/17/22 0532   • sodium chloride 0.9 % flush bag 25 mL  25 mL Intravenous PRN Israel Gomez MD       • sodium chloride (PF) 0.9 % injection 2 mL  2 mL Intracatheter 2 times per day Israel Gomez MD   2 mL at 12/16/22 2153   • heparin (porcine) injection 5,000 Units  5,000 Units Subcutaneous 3 times per day Israel Gomez MD   5,000 Units at 12/17/22 0241   • ibuprofen (MOTRIN) tablet 600 mg  600 mg Oral Q6H PRN Israel Gomez MD       • HYDROcodone-acetaminophen (NORCO) 5-325 MG per tablet 1 tablet  1 tablet Oral Q4H PRN Israel Gomez MD        Or   • HYDROcodone-acetaminophen (NORCO)  MG per tablet 1 tablet  1 tablet Oral Q4H PRN Israel Gomez MD       • ondansetron (ZOFRAN ODT) disintegrating tablet 4 mg  4 mg Oral Q12H PRN Israel Gomez MD        Or   • ondansetron (ZOFRAN) injection 4 mg  4 mg Intravenous Q12H PRN Israel Gomez MD       • dextrose 5 % / sodium chloride 0.45% with KCl 20 mEq infusion   Intravenous Continuous Israel Gomez   mL/hr at 12/17/22 0252 New Bag at 12/17/22 0252   • mupirocin (BACTROBAN) 2 % ointment   Topical TID Israel Gomez MD   Given at 12/16/22 2141   • ceFAZolin (ANCEF) syringe 2,000 mg  2,000 mg Intravenous 3 times per day Israel Gomez MD   2,000 mg at 12/17/22 0533   • atorvastatin (LIPITOR) tablet 10 mg  10 mg Oral Nightly Sanjeev Gordon DO   10 mg at 12/16/22 2146   • gabapentin (NEURONTIN) capsule 300 mg  300 mg Oral BID Sanjeev Gordon DO   300 mg at 12/16/22 2147   • losartan (COZAAR) tablet 50 mg  50 mg Oral Daily Sanjeev Gordon DO       • hydroCHLOROthiazide (HYDRODIURIL) tablet 12.5 mg  12.5 mg Oral Daily Sanjeev Gordon DO       • metoPROLOL succinate (TOPROL-XL) ER tablet 50 mg  50 mg Oral Daily Sanjeev Gordon DO       • NIFEdipine XL (PROCARDIA XL) ER tablet 90 mg  90 mg Oral Daily Sanjeev Gordon DO       • spironolactone (ALDACTONE) tablet 25 mg  25 mg Oral Daily Sanjeev Gordon DO       • sodium chloride 0.9 % flush bag 25 mL  25 mL Intravenous PRN Sanjeev Gordon DO       • polyethylene glycol (MIRALAX) packet 17 g  17 g Oral Daily PRN Sanjeev Gordon DO       • aluminum-magnesium hydroxide-simethicone (MAALOX) 200-200-20 MG/5ML suspension 30 mL  30 mL Oral Q4H PRN Sanjeev Gordon DO       • acetaminophen (TYLENOL) tablet 650 mg  650 mg Oral Q4H PRN Sanjeev Gordon DO       • dextrose 50 % injection 25 g  25 g Intravenous PRN Sanjeev Gordon DO       • dextrose 50 % injection 12.5 g  12.5 g Intravenous PRN Sanjeev Gordon DO       • glucagon (GLUCAGEN) injection 1 mg  1 mg Intramuscular PRN Sanjeev Gordon DO       • dextrose (GLUTOSE) 40 % gel 15 g  15 g Oral PRN Sanjeev Gordon DO       • dextrose (GLUTOSE) 40 % gel 30 g  30 g Oral PRN Sanjeev Gordon DO       • insulin lispro (ADMELOG,HumaLOG) - Correction Dose   Subcutaneous 4x Daily  Sanjeev Gordon, DO   2 Units at 12/16/22 214   • fenofibrate micronized (LOFIBRA) capsule 134 mg  134 mg Oral  Daily with breakfast Sanjeev Gordon DO             Review of Systems  Constitutional: No unexplained weight loss, fevers, chills, night sweats, no swollen glands in the neck, axilla, or groin  Skin: No rashes or nonhealing ulcers.  Eyes: No diplopia, eye pain, or recent worsening in visual acuity.  ENT: No sore throat or hearing loss  Endocrine: No thyroid problems, no history of diabetes, polyuria or nocturia    Cardiovascular: No chest pain, no palpitations, no history of coronary artery disease or arrhythmias.  No history of congestive heart failure  Respiratory: No history of asthma, COPD, exertional shortness of breath, or YULISA  Gastrointestinal: No nausea, no vomiting, diarrhea, abdominal pain, GI bleeding or liver disease.  Musculoskeletal: No joint swelling,   Neurologic: No headache, dizziness, history of TIA or stroke  Hematologic: No unusual bruising or bleeding. no history of DVT/PE, clotting or bleeding disorders.  Psychiatric: No psychiatric problems, hallucinations or depression      Last Recorded Vitals  Visit Vitals  /74   Pulse 62   Temp 98.2 °F (36.8 °C) (Oral)   Resp 18   Ht 5' 10\" (1.778 m)   Wt 104.8 kg (231 lb 0.7 oz)   SpO2 91%   BMI 33.15 kg/m²   v      PE:   General: The patient is alert and oriented ×3, in no acute distress.  She is well-nourished and appears euvolemic.     Head:  Incision site with some dried blood but otherwise ok  Eyes: Pupils are reactive and bilaterally symmetric.  No scleral icterus was seen.  EOMI     Mouth:  Oral mucosa is moist.  There were no oral ulcers or pharyngeal exudates seen.  Neck: Supple.  No increased jugular venous distention.  No cervical or supraclavicular lymphadenopathy was noted.  Good carotid upstroke    Cardiac: Heart is regular rate and rhythm without murmur, normal S3, S4  Lungs: Lungs are clear to auscultation bilaterally.  Abdomen: Soft, nontender, bowel sounds are normoactive.  No masses or organomegaly was  observed  Musculoskeletal: Calves are soft and symmetric.  Negative Homans sign    Neuro: Brief neurologic exam assessing strength, coordination, and sensation is grossly intact  Vasc: Dorsalis pedis pulses are palpable bilaterally.  Capillary refill is less than 2 seconds in the toes.  Psych: Normal affect  Skin: No pathologic skin changes were seen     Imaging  No orders to display     Assessment/Plan:   BASAL CELL CARCINOMA OF Nose s/p Surgical debridement of the wound bed,   left-sided paramedian forehead flap for reconstruction of the left ala for both internal and external lining POD 1  -ENT managing     Dyslipidemia   -cont statin     Essential HTN   -BP well controlled.   -Hold hydrochlorothiazide, hold losartan , Aldactone , hold Nifedipine , metoprolol     Diabetes mellitus Type II   -ISS    Dispo:   Possible discharge today.         Primary Care Physician  MD Andrea Urbina DO [facility]      .

## 2023-09-06 ENCOUNTER — OFFICE (OUTPATIENT)
Dept: URBAN - METROPOLITAN AREA CLINIC 8 | Facility: CLINIC | Age: 73
Setting detail: OPHTHALMOLOGY
End: 2023-09-06
Payer: COMMERCIAL

## 2023-09-06 DIAGNOSIS — H25.12: ICD-10-CM

## 2023-09-06 DIAGNOSIS — Z96.1: ICD-10-CM

## 2023-09-06 DIAGNOSIS — H43.392: ICD-10-CM

## 2023-09-06 DIAGNOSIS — H16.223: ICD-10-CM

## 2023-09-06 PROCEDURE — 92014 COMPRE OPH EXAM EST PT 1/>: CPT | Performed by: OPHTHALMOLOGY

## 2023-09-06 ASSESSMENT — REFRACTION_MANIFEST
OD_VA2: 20/30(J2)
OU_VA: 20/20
OD_AXIS: 170
OS_VA2: 20/30(J2)
OS_SPHERE: -0.50
OD_SPHERE: +0.25
OD_ADD: +2.50
OD_VA2: 20/30(J2)
OD_CYLINDER: -0.50
OS_CYLINDER: -0.50
OS_CYLINDER: -0.50
OS_ADD: +2.75
OD_VA1: 20/20
OS_VA1: 20/20
OS_AXIS: 30
OD_SPHERE: +0.25
OS_AXIS: 30
OS_VA1: 20/20
OS_SPHERE: -0.50
OD_ADD: +2.75
OS_VA2: 20/30(J2)
OD_AXIS: 170
OD_VA1: 20/20
OS_ADD: +2.50
OU_VA: 20/20
OD_CYLINDER: -0.50

## 2023-09-06 ASSESSMENT — AXIALLENGTH_DERIVED
OD_AL: 22.9662
OS_AL: 23.4715
OD_AL: 22.9662
OD_AL: 22.9201
OS_AL: 23.4715

## 2023-09-06 ASSESSMENT — KERATOMETRY
METHOD_AUTO_MANUAL: AUTO
OS_AXISANGLE_DEGREES: 105
OD_K1POWER_DIOPTERS: 44.75
OS_K1POWER_DIOPTERS: 44.50
OS_K2POWER_DIOPTERS: 44.75
OD_K2POWER_DIOPTERS: 45.75
OD_AXISANGLE_DEGREES: 091

## 2023-09-06 ASSESSMENT — REFRACTION_AUTOREFRACTION
OD_AXIS: 164
OD_CYLINDER: -0.25
OS_SPHERE: PLANO
OS_CYLINDER: -0.50
OD_SPHERE: +0.25
OS_AXIS: 045

## 2023-09-06 ASSESSMENT — SPHEQUIV_DERIVED
OD_SPHEQUIV: 0
OD_SPHEQUIV: 0
OD_SPHEQUIV: 0.125
OS_SPHEQUIV: -0.75
OS_SPHEQUIV: -0.75

## 2023-09-06 ASSESSMENT — REFRACTION_CURRENTRX
OS_AXIS: 041
OD_OVR_VA: 20/
OS_OVR_VA: 20/
OD_SPHERE: +0.25
OD_VPRISM_DIRECTION: SV
OD_SPHERE: +2.50
OS_AXIS: 046
OD_SPHERE: +2.50
OS_CYLINDER: -0.75
OS_OVR_VA: 20/
OS_CYLINDER: -0.75
OD_CYLINDER: SPH
OD_OVR_VA: 20/
OD_AXIS: 159
OS_OVR_VA: 20/
OS_CYLINDER: SPH
OD_CYLINDER: SPH
OD_CYLINDER: -0.50
OD_AXIS: 0
OS_VPRISM_DIRECTION: SV
OS_SPHERE: -1.50
OS_SPHERE: +2.50
OD_VPRISM_DIRECTION: SV
OD_OVR_VA: 20/
OS_VPRISM_DIRECTION: SV
OS_VPRISM_DIRECTION: SV
OD_VPRISM_DIRECTION: SV
OS_SPHERE: +1.25

## 2023-09-06 ASSESSMENT — LID POSITION - DERMATOCHALASIS
OS_DERMATOCHALASIS: +1
OD_DERMATOCHALASIS: +1

## 2023-09-06 ASSESSMENT — VISUAL ACUITY
OD_BCVA: 20/20-
OS_BCVA: 20/20

## 2023-09-06 ASSESSMENT — CONFRONTATIONAL VISUAL FIELD TEST (CVF)
OS_FINDINGS: FULL
OD_FINDINGS: FULL

## 2023-12-20 ENCOUNTER — APPOINTMENT (OUTPATIENT)
Dept: CARDIOLOGY | Facility: CLINIC | Age: 73
End: 2023-12-20
Payer: MEDICARE

## 2023-12-20 VITALS
BODY MASS INDEX: 23.49 KG/M2 | WEIGHT: 141 LBS | HEART RATE: 66 BPM | SYSTOLIC BLOOD PRESSURE: 120 MMHG | OXYGEN SATURATION: 100 % | DIASTOLIC BLOOD PRESSURE: 60 MMHG | HEIGHT: 65 IN

## 2023-12-20 DIAGNOSIS — R94.31 ABNORMAL ELECTROCARDIOGRAM [ECG] [EKG]: ICD-10-CM

## 2023-12-20 DIAGNOSIS — E78.5 HYPERLIPIDEMIA, UNSPECIFIED: ICD-10-CM

## 2023-12-20 PROCEDURE — 99204 OFFICE O/P NEW MOD 45 MIN: CPT | Mod: 25

## 2023-12-20 PROCEDURE — 99214 OFFICE O/P EST MOD 30 MIN: CPT | Mod: 25

## 2023-12-20 PROCEDURE — 93000 ELECTROCARDIOGRAM COMPLETE: CPT

## 2023-12-20 RX ORDER — FAMOTIDINE 40 MG/1
40 TABLET, FILM COATED ORAL DAILY
Refills: 0 | Status: ACTIVE | COMMUNITY

## 2023-12-20 NOTE — PHYSICAL EXAM
[Well Developed] : well developed [Well Nourished] : well nourished [No Acute Distress] : no acute distress [Normal Venous Pressure] : normal venous pressure [No Carotid Bruit] : no carotid bruit [Normal S1, S2] : normal S1, S2 [No Murmur] : no murmur [No Rub] : no rub [No Gallop] : no gallop [Clear Lung Fields] : clear lung fields [Good Air Entry] : good air entry [No Respiratory Distress] : no respiratory distress  [Soft] : abdomen soft [Non Tender] : non-tender [No Masses/organomegaly] : no masses/organomegaly [Normal] : normal gait [No Edema] : no edema [No Cyanosis] : no cyanosis [No Clubbing] : no clubbing [Normal Radial B/L] : normal radial B/L [Normal PT B/L] : normal PT B/L [Normal DP B/L] : normal DP B/L [No Focal Deficits] : no focal deficits

## 2024-01-30 ENCOUNTER — NON-APPOINTMENT (OUTPATIENT)
Age: 74
End: 2024-01-30

## 2024-02-22 NOTE — REVIEW OF SYSTEMS
[Negative] : Psychiatric [SOB] : no shortness of breath [Dyspnea on exertion] : not dyspnea during exertion [Chest Discomfort] : no chest discomfort [Lower Ext Edema] : no extremity edema [Leg Claudication] : no intermittent leg claudication [Palpitations] : no palpitations [Orthopnea] : no orthopnea [PND] : no PND [Syncope] : no syncope [FreeTextEntry9] : Chronic back pain

## 2024-02-22 NOTE — REASON FOR VISIT
[CV Risk Factors and Non-Cardiac Disease] : CV risk factors and non-cardiac disease [Hyperlipidemia] : hyperlipidemia [FreeTextEntry3] : Dr. Avendaño [FreeTextEntry1] : Patient is a 73-year-old retired female with a strong family history of CAD who presents to the office today for her annual follow-up.  Patient had been under the care of Dr. Smith who retired and is evaluated annually for an original issue of an abnormal cardiogram.  Patient presents today with no new or active cardiovascular symptoms or concerns taking atorvastatin without issue.

## 2024-02-22 NOTE — DISCUSSION/SUMMARY
[EKG obtained to assist in diagnosis and management of assessed problem(s)] : EKG obtained to assist in diagnosis and management of assessed problem(s) [FreeTextEntry1] : Patient is a very pleasant 74-year-old retired female with a strong family history of CAD who presents today for annual cardiac evaluation.  Patient has a strong family history with her mother having had an MI at age 59.  She has known hyperlipidemia and is taking and tolerating atorvastatin with ideal lipids and LDL at goal.  Patient is not on aspirin but is no documented ASCVD.  Patient was recommended to increase her level of aerobic activity.  No need for an updated stress test as she has had one 1 year ago and is active without symptoms.  Patient was advised to update a full set of laboratory data, continue current regimen and add 30 to 45 minutes of aerobics to her already rigorous workout almost on a daily basis.  Patient was reassured of her cardiovascular status based on her interval cardiac review of systems, physical exam and electrocardiogram.  Patient will return in 6 months for interval follow-up, no other changes were made to her medical regimen    Joel Goldberg, MD, FACC

## 2024-02-22 NOTE — CARDIOLOGY SUMMARY
[de-identified] : (12/20/2023) EKG: NSR at 61 bpm with a frontal QRS axis -45 degrees consistent with left anterior hemiblock, poor R wave progression with good lateral R wave otherwise normal tracing

## 2024-02-22 NOTE — HISTORY OF PRESENT ILLNESS
[FreeTextEntry1] : Patient is a 73-year-old female well-known to this practice who presents today for her annual cardiac exam.  Patient has a strong family history of CAD mother having had an MI at age 59 requiring coronary bypass surgery at 81.  Patient was originally evaluated here under the recommendation of her primary care physician Dr. Avendaño for an abnormal cardiogram notable for poor R progression.  She was evaluated with stress testing last year that revealed nondiagnostic EKG response with imaging that failed to reveal any evidence of her prior infarct or ischemia.  She had a normal blood pressure response and no arrhythmias.  Patient presents today noting that she is active she goes to the gym on a regular basis, she plays pickle ball, does Pilates, yoga.  She can walk 2 miles easily without any symptoms suspicious for ischemic heart disease or any other type of cardiac compromise.  Patient presents today otherwise with no new or active concerns or complaints, taking and tolerating atorvastatin, no recent blood work but last evaluation her LDL was at goal.  Labs reviewed from 1 year ago were notable for an elevated alk phos otherwise all was normal.

## 2024-03-11 PROBLEM — E11.9 DIABETES TYPE 2 NO RETINOPATHY ; BOTH EYES: Status: ACTIVE | Noted: 2024-03-11

## 2024-05-06 ENCOUNTER — OFFICE (OUTPATIENT)
Dept: URBAN - METROPOLITAN AREA CLINIC 8 | Facility: CLINIC | Age: 74
Setting detail: OPHTHALMOLOGY
End: 2024-05-06
Payer: COMMERCIAL

## 2024-05-06 DIAGNOSIS — H25.12: ICD-10-CM

## 2024-05-06 PROBLEM — H16.223 DRY EYE SYNDROME K SICCA; BOTH EYES: Status: ACTIVE | Noted: 2024-05-06

## 2024-05-06 PROCEDURE — 99213 OFFICE O/P EST LOW 20 MIN: CPT | Performed by: OPHTHALMOLOGY

## 2024-05-06 ASSESSMENT — CONFRONTATIONAL VISUAL FIELD TEST (CVF)
OD_FINDINGS: FULL
OS_FINDINGS: FULL

## 2024-05-06 ASSESSMENT — LID POSITION - DERMATOCHALASIS
OD_DERMATOCHALASIS: +1
OS_DERMATOCHALASIS: +1

## 2024-06-19 ENCOUNTER — NON-APPOINTMENT (OUTPATIENT)
Age: 74
End: 2024-06-19

## 2024-06-19 ENCOUNTER — APPOINTMENT (OUTPATIENT)
Dept: CARDIOLOGY | Facility: CLINIC | Age: 74
End: 2024-06-19
Payer: MEDICARE

## 2024-06-19 VITALS
OXYGEN SATURATION: 98 % | HEART RATE: 71 BPM | SYSTOLIC BLOOD PRESSURE: 120 MMHG | BODY MASS INDEX: 23.99 KG/M2 | WEIGHT: 144 LBS | DIASTOLIC BLOOD PRESSURE: 76 MMHG | HEIGHT: 65 IN

## 2024-06-19 DIAGNOSIS — I35.1 NONRHEUMATIC AORTIC (VALVE) INSUFFICIENCY: ICD-10-CM

## 2024-06-19 PROCEDURE — 93000 ELECTROCARDIOGRAM COMPLETE: CPT

## 2024-06-19 PROCEDURE — 99204 OFFICE O/P NEW MOD 45 MIN: CPT

## 2024-06-19 PROCEDURE — 99214 OFFICE O/P EST MOD 30 MIN: CPT

## 2024-06-19 NOTE — REASON FOR VISIT
[FreeTextEntry3] : Dr. Chappell [FreeTextEntry1] : Patient is a 74-year-old retired female who presents the office today for her semiannual follow-up.  She has a strong family history of CAD who presents to the office today with no new or active cardiovascular symptoms, taking and tolerating all of her medication.  Patient previously has been under the care of Dr. Smith who retired and is evaluated annually for an original issue of an abnormal cardiogram.  Patient presents today concerned about her recent weight gain, understands that it is her continued interest in excessive carbohydrates because slight weight gain and elevation of LDL.  Patient otherwise looks and feels well with no new or active concerns or complaints.

## 2024-06-19 NOTE — HISTORY OF PRESENT ILLNESS
[FreeTextEntry1] : Patient is a 74-year-old female well-known to this practice who presents today for a semiannual evaluation.   Patient has a strong family history of CAD mother having had an MI at age 59 requiring coronary bypass surgery at 81. Patient was originally evaluated here under the recommendation of her primary care physician Dr. Avendaño for an abnormal cardiogram notable for poor R progression. She was evaluated with stress testing last year that revealed nondiagnostic EKG response with imaging that failed to reveal any evidence of her prior infarct or ischemia. She had a normal blood pressure response and no arrhythmias.  She has been active since that time exercising several days per week without any symptomatology suspicious for coronary insufficiency.  She presents today noting she has had some weight gain because of her continued dietary behavior which includes excess carbohydrate intake but she remains involved in strenuous activity without cardiac symptoms.  She notes that she is taking and tolerating statin therapy and comes with blood work and hand noting that her LDL drifted up just over 100 previously in the 80s.  Patient presents today noting that she is active she goes to the gym on a regular basis, she plays pickle ball, does Pilates, yoga. She can walk 2 miles easily without any symptoms suspicious for ischemic heart disease or any other type of cardiac compromise. Patient presents today otherwise with no new or active concerns or complaints, taking and tolerating atorvastatin, no recent blood work but last evaluation her LDL was at goal at 83 now at 104.. Labs reviewed from 6/13/2024 were all within acceptable limits with exception of a slight rise in LDL and an elevated B12

## 2024-06-19 NOTE — CARDIOLOGY SUMMARY
[de-identified] : (6/19/2024) EKG NSR at 66 bpm with a frontal QRS axis of -45 degrees, poor R progression.  Unchanged from prior tracing  (12/20/2023) EKG: NSR at 61 bpm with a frontal QRS axis -45 degrees consistent with left anterior hemiblock, poor R wave progression with good lateral R wave otherwise normal tracing  [de-identified] : (11/16/2022) ECHOCARDIOGRAPHIC CONCLUSIONS: 1. Left ventricular ejection fraction is estimated at 60 to 65%. 2. Mild mitral valve regurgitation. 3. No pericardial effusion seen. 4. Trace aortic regurgitation. 5. Trileaflet aortic valve with normal systolic excursion. 6. TTEreport from 5/29/2018, no significant changes are seen.

## 2024-06-19 NOTE — DISCUSSION/SUMMARY
[FreeTextEntry1] : Patient is a very pleasant 74-year-old retired female with a strong family history of CAD who presents today for routine interval follow-up.  Patient's interval cardiac review of systems and history is unremarkable.  She is quite active without any symptomatology.  Her LDL drifted slightly upward because of excessive carbohydrate intake but otherwise weight and activity level is stable.  Patient has a strong family history with her mother having had an MI at age 59. She has known hyperlipidemia and is taking and tolerating atorvastatin with ideal lipids and LDL at goal. Patient is not on aspirin but is no documented ASCVD.  She underwent stress testing 2 years ago with a nondiagnostic EKG response but negative imaging.  She was recommended to update stress testing this year with a stress echo as she was found to have mild AI in the past.  An updated echocardiogram will be performed in 3 months and a stress echo shortly thereafter or at 6 months when she has surgical follow-up.  She was extensively counseled on cutting her carbohydrates and will have blood work redrawn in 3 months.  Co-Q10 200 mg/day was suggested as well as vitamin K2 with her vit   Patient was reassured of her cardiovascular status based on her interval cardiac review of systems, physical exam and electrocardiogram.  Patient will return in 3 months for baseline echo to evaluate her aortic insufficiency as well as blood work in hopes that she brings her LDL back down below 100.  Shortly thereafter a stress echo will be obtained to rule out any coronary insufficiency.  Joel Goldberg, MD, FACC   [EKG obtained to assist in diagnosis and management of assessed problem(s)] : EKG obtained to assist in diagnosis and management of assessed problem(s)

## 2024-08-09 NOTE — DISCHARGE NOTE ADULT - CAREGIVER NAME
SAME Show Applicator Variable?: Yes Consent: The patient's consent was obtained including but not limited to risks of crusting, scabbing, blistering, scarring, darker or lighter pigmentary change, recurrence, incomplete removal and infection. Detail Level: Detailed Application Tool (Optional): Liquid Nitrogen Sprayer Render Note In Bullet Format When Appropriate: No Duration Of Freeze Thaw-Cycle (Seconds): 10 Post-Care Instructions: I reviewed with the patient in detail post-care instructions. Patient is to wear sunprotection, and avoid picking at any of the treated lesions. Pt may apply Vaseline to crusted or scabbing areas. Number Of Freeze-Thaw Cycles: 1 freeze-thaw cycle

## 2024-09-20 ENCOUNTER — OFFICE (OUTPATIENT)
Dept: URBAN - METROPOLITAN AREA CLINIC 8 | Facility: CLINIC | Age: 74
Setting detail: OPHTHALMOLOGY
End: 2024-09-20
Payer: COMMERCIAL

## 2024-09-20 DIAGNOSIS — H26.492: ICD-10-CM

## 2024-09-20 DIAGNOSIS — H02.831: ICD-10-CM

## 2024-09-20 DIAGNOSIS — H16.223: ICD-10-CM

## 2024-09-20 PROBLEM — H02.132 ECTROPION-SENILE; RIGHT LOWER LID, LEFT LOWER LID: Status: ACTIVE | Noted: 2024-09-20

## 2024-09-20 PROBLEM — H02.135 ECTROPION-SENILE; RIGHT LOWER LID, LEFT LOWER LID: Status: ACTIVE | Noted: 2024-09-20

## 2024-09-20 PROBLEM — H25.11 CATARACT; RIGHT EYE: Status: ACTIVE | Noted: 2024-09-20

## 2024-09-20 PROBLEM — H02.834 DERMATOCHALASIS; RIGHT UPPER LID, LEFT UPPER LID: Status: ACTIVE | Noted: 2024-09-20

## 2024-09-20 PROCEDURE — 99213 OFFICE O/P EST LOW 20 MIN: CPT | Performed by: OPTOMETRIST

## 2024-09-20 ASSESSMENT — LID POSITION - DERMATOCHALASIS
OS_DERMATOCHALASIS: 1+
OD_DERMATOCHALASIS: 1+

## 2024-09-20 ASSESSMENT — LID POSITION - COMMENTS
OS_COMMENTS: PUNCTAL
OD_COMMENTS: PUNCTAL

## 2024-09-20 ASSESSMENT — LID POSITION - ECTROPION
OD_ECTROPION: RLL
OS_ECTROPION: LLL

## 2024-09-20 ASSESSMENT — CONFRONTATIONAL VISUAL FIELD TEST (CVF)
OS_FINDINGS: FULL
OD_FINDINGS: FULL

## 2024-10-17 ENCOUNTER — NON-APPOINTMENT (OUTPATIENT)
Age: 74
End: 2024-10-17

## 2024-10-17 ENCOUNTER — APPOINTMENT (OUTPATIENT)
Dept: CARDIOLOGY | Facility: CLINIC | Age: 74
End: 2024-10-17
Payer: MEDICARE

## 2024-10-17 VITALS
HEART RATE: 74 BPM | BODY MASS INDEX: 23.3 KG/M2 | DIASTOLIC BLOOD PRESSURE: 64 MMHG | SYSTOLIC BLOOD PRESSURE: 118 MMHG | OXYGEN SATURATION: 99 % | WEIGHT: 140 LBS

## 2024-10-17 DIAGNOSIS — R07.89 OTHER CHEST PAIN: ICD-10-CM

## 2024-10-17 DIAGNOSIS — E78.5 HYPERLIPIDEMIA, UNSPECIFIED: ICD-10-CM

## 2024-10-17 DIAGNOSIS — I34.1 NONRHEUMATIC MITRAL (VALVE) PROLAPSE: ICD-10-CM

## 2024-10-17 PROCEDURE — 99214 OFFICE O/P EST MOD 30 MIN: CPT

## 2024-10-17 PROCEDURE — 93000 ELECTROCARDIOGRAM COMPLETE: CPT

## 2024-10-17 PROCEDURE — 93306 TTE W/DOPPLER COMPLETE: CPT

## 2024-10-17 RX ORDER — MULTIVIT-MIN/FOLIC/VIT K/LYCOP 400-300MCG
10 MCG TABLET ORAL DAILY
Refills: 0 | Status: ACTIVE | COMMUNITY

## 2024-10-17 RX ORDER — PNV NO.95/FERROUS FUM/FOLIC AC 28MG-0.8MG
100 TABLET ORAL DAILY
Refills: 0 | Status: ACTIVE | COMMUNITY

## 2024-10-17 RX ORDER — METHYLDOPA/HYDROCHLOROTHIAZIDE 250MG-15MG
TABLET ORAL DAILY
Refills: 0 | Status: ACTIVE | COMMUNITY

## 2024-10-17 RX ORDER — UBIDECARENONE/VIT E ACET 100MG-5
CAPSULE ORAL TWICE DAILY
Refills: 0 | Status: ACTIVE | COMMUNITY

## 2024-11-13 ENCOUNTER — OFFICE (OUTPATIENT)
Dept: URBAN - METROPOLITAN AREA CLINIC 8 | Facility: CLINIC | Age: 74
Setting detail: OPHTHALMOLOGY
End: 2024-11-13
Payer: COMMERCIAL

## 2024-11-13 DIAGNOSIS — H25.11: ICD-10-CM

## 2024-11-13 PROCEDURE — 92136 OPHTHALMIC BIOMETRY: CPT | Performed by: OPHTHALMOLOGY

## 2024-11-13 PROCEDURE — 99213 OFFICE O/P EST LOW 20 MIN: CPT | Performed by: OPHTHALMOLOGY

## 2024-11-13 ASSESSMENT — REFRACTION_CURRENTRX
OS_VPRISM_DIRECTION: SV
OS_VPRISM_DIRECTION: SV
OS_CYLINDER: -0.75
OD_OVR_VA: 20/
OD_VPRISM_DIRECTION: SV
OD_AXIS: 159
OD_OVR_VA: 20/
OD_VPRISM_DIRECTION: SV
OD_CYLINDER: -0.50
OS_OVR_VA: 20/
OD_CYLINDER: SPH
OS_OVR_VA: 20/
OD_SPHERE: +0.25
OS_VPRISM_DIRECTION: SV
OD_OVR_VA: 20/
OD_AXIS: 0
OS_CYLINDER: -0.75
OS_OVR_VA: 20/
OS_AXIS: 046
OD_SPHERE: +2.75
OS_CYLINDER: SPH
OS_SPHERE: +1.25
OS_SPHERE: +2.75
OD_CYLINDER: SPH
OD_SPHERE: +2.50
OS_SPHERE: -1.50
OD_VPRISM_DIRECTION: SV
OS_AXIS: 041

## 2024-11-13 ASSESSMENT — LID POSITION - ECTROPION
OS_ECTROPION: LLL
OD_ECTROPION: RLL

## 2024-11-13 ASSESSMENT — REFRACTION_MANIFEST
OS_VA2: 20/30(J2)
OD_SPHERE: +0.25
OD_SPHERE: +0.25
OS_CYLINDER: -0.50
OD_ADD: +2.75
OS_AXIS: 30
OS_AXIS: 30
OD_AXIS: 170
OS_VA2: 20/30(J2)
OS_CYLINDER: -0.50
OS_VA1: 20/20
OD_ADD: +2.50
OS_ADD: +2.50
OS_SPHERE: -0.50
OS_ADD: +2.75
OU_VA: 20/20
OD_VA2: 20/30(J2)
OU_VA: 20/20
OD_VA1: 20/20
OD_CYLINDER: -0.50
OD_VA2: 20/30(J2)
OS_VA1: 20/20
OD_CYLINDER: -0.50
OS_SPHERE: -0.50
OD_VA1: 20/20
OD_AXIS: 170

## 2024-11-13 ASSESSMENT — REFRACTION_AUTOREFRACTION
OD_SPHERE: PLANO
OS_SPHERE: PLANO
OD_AXIS: 167
OD_CYLINDER: -0.50
OS_AXIS: 064
OS_CYLINDER: -0.50

## 2024-11-13 ASSESSMENT — KERATOMETRY
OS_K2POWER_DIOPTERS: 44.75
OD_AXISANGLE_DEGREES: 084
OS_K1POWER_DIOPTERS: 44.50
OD_K2POWER_DIOPTERS: 45.50
OS_AXISANGLE_DEGREES: 134
OD_K1POWER_DIOPTERS: 44.75
METHOD_AUTO_MANUAL: AUTO

## 2024-11-13 ASSESSMENT — LID POSITION - DERMATOCHALASIS
OD_DERMATOCHALASIS: 1+
OS_DERMATOCHALASIS: 1+

## 2024-11-13 ASSESSMENT — LID POSITION - COMMENTS
OS_COMMENTS: PUNCTAL
OD_COMMENTS: PUNCTAL

## 2024-11-13 ASSESSMENT — CONFRONTATIONAL VISUAL FIELD TEST (CVF)
OS_FINDINGS: FULL
OD_FINDINGS: FULL

## 2024-11-13 ASSESSMENT — VISUAL ACUITY
OS_BCVA: 20/40
OD_BCVA: 20/20-2

## 2024-12-02 ENCOUNTER — AMBULATORY SURGERY CENTER (OUTPATIENT)
Dept: URBAN - METROPOLITAN AREA SURGERY 4 | Facility: SURGERY | Age: 74
Setting detail: OPHTHALMOLOGY
End: 2024-12-02
Payer: COMMERCIAL

## 2024-12-02 DIAGNOSIS — H52.211: ICD-10-CM

## 2024-12-02 DIAGNOSIS — H25.11: ICD-10-CM

## 2024-12-02 PROCEDURE — FEMTO FEMTOSECOND LASER: Mod: GY | Performed by: OPHTHALMOLOGY

## 2024-12-02 PROCEDURE — 66984 XCAPSL CTRC RMVL W/O ECP: CPT | Mod: RT | Performed by: OPHTHALMOLOGY

## 2024-12-03 ENCOUNTER — OFFICE (OUTPATIENT)
Dept: URBAN - METROPOLITAN AREA CLINIC 8 | Facility: CLINIC | Age: 74
Setting detail: OPHTHALMOLOGY
End: 2024-12-03
Payer: COMMERCIAL

## 2024-12-03 DIAGNOSIS — Z96.1: ICD-10-CM

## 2024-12-03 PROCEDURE — 99024 POSTOP FOLLOW-UP VISIT: CPT

## 2024-12-03 ASSESSMENT — KERATOMETRY
OS_K2POWER_DIOPTERS: 45.00
OD_K1POWER_DIOPTERS: 44.50
OD_K2POWER_DIOPTERS: 45.00
OS_K1POWER_DIOPTERS: 44.75
OS_AXISANGLE_DEGREES: 126
OD_AXISANGLE_DEGREES: 089
METHOD_AUTO_MANUAL: AUTO

## 2024-12-03 ASSESSMENT — LID POSITION - ECTROPION
OS_ECTROPION: LLL
OD_ECTROPION: RLL

## 2024-12-03 ASSESSMENT — REFRACTION_CURRENTRX
OS_OVR_VA: 20/
OS_SPHERE: -1.50
OD_OVR_VA: 20/
OD_VPRISM_DIRECTION: SV
OD_AXIS: 0
OD_SPHERE: +0.25
OS_OVR_VA: 20/
OD_SPHERE: +2.50
OS_SPHERE: +2.75
OS_VPRISM_DIRECTION: SV
OS_CYLINDER: SPH
OD_SPHERE: +2.75
OD_OVR_VA: 20/
OS_AXIS: 046
OS_VPRISM_DIRECTION: SV
OD_CYLINDER: SPH
OS_CYLINDER: -0.75
OS_OVR_VA: 20/
OS_CYLINDER: -0.75
OS_AXIS: 041
OS_SPHERE: +1.25
OS_VPRISM_DIRECTION: SV
OD_CYLINDER: SPH
OD_CYLINDER: -0.50
OD_VPRISM_DIRECTION: SV
OD_OVR_VA: 20/
OD_VPRISM_DIRECTION: SV
OD_AXIS: 159

## 2024-12-03 ASSESSMENT — REFRACTION_MANIFEST
OD_ADD: +2.75
OD_VA2: 20/30(J2)
OS_CYLINDER: -0.50
OS_ADD: +2.50
OD_CYLINDER: -0.50
OD_VA1: 20/20
OS_VA1: 20/20
OD_SPHERE: +0.25
OS_SPHERE: -0.50
OU_VA: 20/20
OS_VA2: 20/30(J2)
OS_ADD: +2.75
OS_AXIS: 30
OS_CYLINDER: -0.50
OS_VA1: 20/20
OD_SPHERE: +0.25
OD_VA1: 20/20
OS_SPHERE: -0.50
OD_ADD: +2.50
OD_AXIS: 170
OD_CYLINDER: -0.50
OD_AXIS: 170
OS_AXIS: 30
OU_VA: 20/20
OS_VA2: 20/30(J2)
OD_VA2: 20/30(J2)

## 2024-12-03 ASSESSMENT — CONFRONTATIONAL VISUAL FIELD TEST (CVF)
OD_FINDINGS: FULL
OS_FINDINGS: FULL

## 2024-12-03 ASSESSMENT — VISUAL ACUITY
OD_BCVA: 20/40+
OS_BCVA: 20/40

## 2024-12-03 ASSESSMENT — REFRACTION_AUTOREFRACTION
OD_SPHERE: -0.25
OD_CYLINDER: -0.25
OD_AXIS: 038
OS_AXIS: 063
OS_SPHERE: PLANO
OS_CYLINDER: -0.50

## 2024-12-03 ASSESSMENT — LID POSITION - COMMENTS
OS_COMMENTS: PUNCTAL
OD_COMMENTS: PUNCTAL

## 2024-12-03 ASSESSMENT — TONOMETRY: OS_IOP_MMHG: 12

## 2024-12-03 ASSESSMENT — LID POSITION - DERMATOCHALASIS
OD_DERMATOCHALASIS: 1+
OS_DERMATOCHALASIS: 1+

## 2024-12-03 ASSESSMENT — CORNEAL EDEMA CLINICAL DESCRIPTION: OD_CORNEALEDEMA: T @ INCISION

## 2024-12-10 ENCOUNTER — OFFICE (OUTPATIENT)
Dept: URBAN - METROPOLITAN AREA CLINIC 8 | Facility: CLINIC | Age: 74
Setting detail: OPHTHALMOLOGY
End: 2024-12-10
Payer: COMMERCIAL

## 2024-12-10 ENCOUNTER — RX ONLY (RX ONLY)
Age: 74
End: 2024-12-10

## 2024-12-10 DIAGNOSIS — Z96.1: ICD-10-CM

## 2024-12-10 PROCEDURE — 99024 POSTOP FOLLOW-UP VISIT: CPT

## 2024-12-10 ASSESSMENT — REFRACTION_MANIFEST
OS_VA2: 20/30(J2)
OD_ADD: +2.75
OD_CYLINDER: -0.50
OD_VA1: 20/20
OS_ADD: +2.50
OS_ADD: +2.75
OU_VA: 20/20
OS_CYLINDER: -0.50
OD_AXIS: 170
OS_VA1: 20/20
OD_SPHERE: +0.25
OD_ADD: +2.50
OS_AXIS: 30
OS_CYLINDER: -0.50
OD_VA2: 20/30(J2)
OD_SPHERE: +0.25
OS_VA1: 20/20
OD_VA2: 20/30(J2)
OU_VA: 20/20
OS_VA2: 20/30(J2)
OS_SPHERE: -0.50
OS_AXIS: 30
OD_CYLINDER: -0.50
OS_SPHERE: -0.50
OD_VA1: 20/20
OD_AXIS: 170

## 2024-12-10 ASSESSMENT — KERATOMETRY
OD_K1POWER_DIOPTERS: 44.75
METHOD_AUTO_MANUAL: AUTO
OS_K2POWER_DIOPTERS: 45.00
OD_K2POWER_DIOPTERS: 45.75
OS_K1POWER_DIOPTERS: 44.75
OS_AXISANGLE_DEGREES: 106
OD_AXISANGLE_DEGREES: 087

## 2024-12-10 ASSESSMENT — LID POSITION - DERMATOCHALASIS
OS_DERMATOCHALASIS: 1+
OD_DERMATOCHALASIS: 1+

## 2024-12-10 ASSESSMENT — REFRACTION_CURRENTRX
OS_SPHERE: +1.25
OD_SPHERE: +0.25
OD_CYLINDER: SPH
OD_SPHERE: +2.50
OS_VPRISM_DIRECTION: SV
OS_CYLINDER: -0.75
OS_OVR_VA: 20/
OS_OVR_VA: 20/
OD_AXIS: 0
OD_CYLINDER: SPH
OD_VPRISM_DIRECTION: SV
OS_OVR_VA: 20/
OS_AXIS: 041
OD_AXIS: 159
OD_OVR_VA: 20/
OS_CYLINDER: SPH
OD_OVR_VA: 20/
OS_SPHERE: -1.50
OD_SPHERE: +2.50
OD_VPRISM_DIRECTION: SV
OS_SPHERE: +2.50
OD_CYLINDER: -0.50
OS_VPRISM_DIRECTION: SV
OS_CYLINDER: -0.75
OD_VPRISM_DIRECTION: SV
OD_OVR_VA: 20/
OS_VPRISM_DIRECTION: SV
OS_AXIS: 046

## 2024-12-10 ASSESSMENT — REFRACTION_AUTOREFRACTION
OD_AXIS: 148
OD_SPHERE: -0.25
OS_CYLINDER: -0.50
OS_AXIS: 048
OD_CYLINDER: -0.50
OS_SPHERE: -0.25

## 2024-12-10 ASSESSMENT — LID POSITION - COMMENTS
OD_COMMENTS: PUNCTAL
OS_COMMENTS: PUNCTAL

## 2024-12-10 ASSESSMENT — LID POSITION - ECTROPION
OS_ECTROPION: LLL
OD_ECTROPION: RLL

## 2024-12-10 ASSESSMENT — TONOMETRY
OD_IOP_MMHG: 14
OS_IOP_MMHG: 13

## 2024-12-10 ASSESSMENT — CONFRONTATIONAL VISUAL FIELD TEST (CVF)
OD_FINDINGS: FULL
OS_FINDINGS: FULL

## 2024-12-10 ASSESSMENT — VISUAL ACUITY
OD_BCVA: 20/20-1
OS_BCVA: 20/25+2

## 2024-12-24 NOTE — PATIENT PROFILE ADULT. - TYPE OF ADMISSION, PATIENT PROFILE
----- Message from Med Assistant Hernandez sent at 12/23/2024  4:27 PM CST -----  Contact: Shereen  Pt thinks she has parasites in her nose that are slithering and the liquid in her nose is burning her and its not snot  ----- Message -----  From: Kasey Newman  Sent: 12/23/2024   4:14 PM CST  To: [facility] Ent Clinical Staff    Shereen is calling to speak to the nurse regarding some questions she would like to ask to get medical advice before going out of town dealing with her sinus, please give her a call at  [phone]    Thanks  LJ   Scheduled/Direct

## 2024-12-28 ENCOUNTER — OFFICE (OUTPATIENT)
Dept: URBAN - METROPOLITAN AREA CLINIC 8 | Facility: CLINIC | Age: 74
Setting detail: OPHTHALMOLOGY
End: 2024-12-28
Payer: COMMERCIAL

## 2024-12-28 DIAGNOSIS — Z96.1: ICD-10-CM

## 2024-12-28 PROCEDURE — 99024 POSTOP FOLLOW-UP VISIT: CPT | Performed by: OPHTHALMOLOGY

## 2024-12-28 ASSESSMENT — REFRACTION_MANIFEST
OS_ADD: +2.75
OS_ADD: +2.50
OU_VA: 20/20
OD_CYLINDER: -0.50
OS_AXIS: 30
OD_VA2: 20/30(J2)
OS_VA2: 20/30(J2)
OS_CYLINDER: -0.50
OU_VA: 20/20
OS_AXIS: 30
OS_VA2: 20/30(J2)
OD_ADD: +2.75
OD_VA1: 20/20
OD_ADD: +2.50
OD_AXIS: 170
OS_SPHERE: -0.50
OD_AXIS: 170
OD_VA2: 20/30(J2)
OS_VA1: 20/20
OS_SPHERE: -0.50
OD_CYLINDER: -0.50
OD_SPHERE: +0.25
OS_CYLINDER: -0.50
OD_VA1: 20/20
OS_VA1: 20/20
OD_SPHERE: +0.25

## 2024-12-28 ASSESSMENT — REFRACTION_AUTOREFRACTION
OS_AXIS: 037
OD_AXIS: 176
OD_SPHERE: PLANO
OD_CYLINDER: -0.25
OS_CYLINDER: -0.25
OS_SPHERE: PLANO

## 2024-12-28 ASSESSMENT — VISUAL ACUITY
OD_BCVA: 20/20
OS_BCVA: 20/20

## 2024-12-28 ASSESSMENT — KERATOMETRY
OD_K2POWER_DIOPTERS: 45.25
OS_AXISANGLE_DEGREES: 110
METHOD_AUTO_MANUAL: AUTO
OD_AXISANGLE_DEGREES: 083
OD_K1POWER_DIOPTERS: 44.50
OS_K1POWER_DIOPTERS: 44.50
OS_K2POWER_DIOPTERS: 45.00

## 2024-12-28 ASSESSMENT — REFRACTION_CURRENTRX
OS_CYLINDER: -0.75
OD_VPRISM_DIRECTION: SV
OS_VPRISM_DIRECTION: SV
OD_VPRISM_DIRECTION: SV
OS_OVR_VA: 20/
OS_SPHERE: +1.25
OS_OVR_VA: 20/
OS_CYLINDER: -0.75
OD_CYLINDER: -0.50
OD_SPHERE: +0.25
OD_OVR_VA: 20/
OS_SPHERE: -1.50
OD_CYLINDER: SPH
OS_VPRISM_DIRECTION: SV
OD_AXIS: 0
OS_ADD: +2.50
OS_AXIS: 041
OD_VPRISM_DIRECTION: SV
OD_AXIS: 159
OS_VPRISM_DIRECTION: SV
OD_ADD: +2.50
OD_SPHERE: +2.50
OS_OVR_VA: 20/
OS_AXIS: 046
OD_OVR_VA: 20/
OD_OVR_VA: 20/

## 2024-12-28 ASSESSMENT — LID POSITION - ECTROPION
OS_ECTROPION: LLL
OD_ECTROPION: RLL

## 2024-12-28 ASSESSMENT — LID POSITION - COMMENTS
OS_COMMENTS: PUNCTAL
OD_COMMENTS: PUNCTAL

## 2024-12-28 ASSESSMENT — LID POSITION - DERMATOCHALASIS
OS_DERMATOCHALASIS: 1+
OD_DERMATOCHALASIS: 1+

## 2024-12-28 ASSESSMENT — TONOMETRY
OD_IOP_MMHG: 14
OS_IOP_MMHG: 14

## 2024-12-28 ASSESSMENT — CONFRONTATIONAL VISUAL FIELD TEST (CVF)
OS_FINDINGS: FULL
OD_FINDINGS: FULL

## 2025-03-17 PROBLEM — E11.9 DIABETES TYPE 2 NO RETINOPATHY ; BOTH EYES: Status: ACTIVE | Noted: 2025-03-17

## 2025-04-09 ENCOUNTER — NON-APPOINTMENT (OUTPATIENT)
Age: 75
End: 2025-04-09

## 2025-04-10 ENCOUNTER — APPOINTMENT (OUTPATIENT)
Dept: CARDIOLOGY | Facility: CLINIC | Age: 75
End: 2025-04-10
Payer: MEDICARE

## 2025-04-10 ENCOUNTER — NON-APPOINTMENT (OUTPATIENT)
Age: 75
End: 2025-04-10

## 2025-04-10 VITALS
OXYGEN SATURATION: 99 % | WEIGHT: 141 LBS | BODY MASS INDEX: 23.46 KG/M2 | HEART RATE: 76 BPM | DIASTOLIC BLOOD PRESSURE: 66 MMHG | SYSTOLIC BLOOD PRESSURE: 126 MMHG

## 2025-04-10 DIAGNOSIS — I34.1 NONRHEUMATIC MITRAL (VALVE) PROLAPSE: ICD-10-CM

## 2025-04-10 DIAGNOSIS — E78.5 HYPERLIPIDEMIA, UNSPECIFIED: ICD-10-CM

## 2025-04-10 DIAGNOSIS — R94.31 ABNORMAL ELECTROCARDIOGRAM [ECG] [EKG]: ICD-10-CM

## 2025-04-10 PROCEDURE — 99214 OFFICE O/P EST MOD 30 MIN: CPT

## 2025-04-10 PROCEDURE — 93000 ELECTROCARDIOGRAM COMPLETE: CPT

## 2025-04-15 VITALS
DIASTOLIC BLOOD PRESSURE: 79 MMHG | OXYGEN SATURATION: 96 % | SYSTOLIC BLOOD PRESSURE: 124 MMHG | WEIGHT: 141.1 LBS | RESPIRATION RATE: 16 BRPM | TEMPERATURE: 98 F | HEIGHT: 64 IN | HEART RATE: 73 BPM

## 2025-04-15 RX ORDER — POVIDONE-IODINE 7.5 %
1 SOLUTION, NON-ORAL TOPICAL ONCE
Refills: 0 | Status: COMPLETED | OUTPATIENT
Start: 2025-04-16 | End: 2025-04-16

## 2025-04-15 NOTE — H&P ADULT - NSHPLABSRESULTS_GEN_ALL_CORE
Preop CBC, BMP within normal limits- reviewed by medical clearance.   Preop EKG: NSR, HR 60, reviewed by medical clearance.   Stress test 2022 reviewed, WNL per medical clearance  Echo 2024 reviewed, EF 60-65% per medical clearance  Preop Cr 0.69  Preop H/H 12.9/40.5  Preop plts 144

## 2025-04-15 NOTE — ASU PATIENT PROFILE, ADULT - NSICDXPASTSURGICALHX_GEN_ALL_CORE_FT
PAST SURGICAL HISTORY:  H/O cataract extraction B/L    H/O colonoscopy     H/O left knee surgery     History of tonsillectomy

## 2025-04-15 NOTE — ASU PATIENT PROFILE, ADULT - NSICDXPASTMEDICALHX_GEN_ALL_CORE_FT
PAST MEDICAL HISTORY:  GERD (gastroesophageal reflux disease)     HLD (hyperlipidemia)     Scoliosis

## 2025-04-15 NOTE — H&P ADULT - NSHPPHYSICALEXAM_GEN_ALL_CORE
PE:  Gen:  MSK: Decreased ROM to right knee secondary to pain.    Rest of PE per medical clearance PE per medical clearance note

## 2025-04-15 NOTE — H&P ADULT - PROBLEM SELECTOR PLAN 1
Admit to Orthopaedic Service.  Presents today for elective right TKA   Pt medically stable and cleared for procedure today by  Admit to Orthopaedic Service.  Presents today for elective right TKA   Pt medically stable and cleared for procedure today by Dr. Avendaño, Dr Goldberg (Cardio)

## 2025-04-15 NOTE — ASU PATIENT PROFILE, ADULT - VISION (WITH CORRECTIVE LENSES IF THE PATIENT USUALLY WEARS THEM):
GLASSES GLASSES/Normal vision: sees adequately in most situations; can see medication labels, newsprint

## 2025-04-15 NOTE — H&P ADULT - HISTORY OF PRESENT ILLNESS
75F with right knee pain x    Has the patient used any narcotic for more than 90 days prior to the date of surgery? YES or NO   Presents today for right TKA. 75F with right knee pain x     Has the patient used any narcotic for more than 90 days prior to the date of surgery? YES or NO   Presents today for right TKA. 75F with right knee pain x chronic. Patient has tried and failed conservative treatment of right knee pain.    Has the patient used any narcotic for more than 90 days prior to the date of surgery? NO   Presents today for right TKA. 75F with right knee pain x 1 year. Denies numbness or tingling into bilateral lower extremities. Denies taking any medication for pain. Patient has tried and failed conservative treatment of right knee pain.    Has the patient used any narcotic for more than 90 days prior to the date of surgery? NO   Presents today for right TKA.

## 2025-04-16 ENCOUNTER — RESULT REVIEW (OUTPATIENT)
Age: 75
End: 2025-04-16

## 2025-04-16 ENCOUNTER — INPATIENT (INPATIENT)
Facility: HOSPITAL | Age: 75
LOS: 0 days | Discharge: HOME HEALTH | DRG: 470 | End: 2025-04-17
Payer: MEDICARE

## 2025-04-16 DIAGNOSIS — Z98.49 CATARACT EXTRACTION STATUS, UNSPECIFIED EYE: Chronic | ICD-10-CM

## 2025-04-16 DIAGNOSIS — Z98.890 OTHER SPECIFIED POSTPROCEDURAL STATES: Chronic | ICD-10-CM

## 2025-04-16 DIAGNOSIS — E78.5 HYPERLIPIDEMIA, UNSPECIFIED: ICD-10-CM

## 2025-04-16 DIAGNOSIS — K21.9 GASTRO-ESOPHAGEAL REFLUX DISEASE WITHOUT ESOPHAGITIS: ICD-10-CM

## 2025-04-16 DIAGNOSIS — M19.90 UNSPECIFIED OSTEOARTHRITIS, UNSPECIFIED SITE: ICD-10-CM

## 2025-04-16 DIAGNOSIS — Z90.89 ACQUIRED ABSENCE OF OTHER ORGANS: Chronic | ICD-10-CM

## 2025-04-16 PROCEDURE — 73560 X-RAY EXAM OF KNEE 1 OR 2: CPT | Mod: 26,RT

## 2025-04-16 DEVICE — CELLERATE SURGICAL POWDER RX 5GM: Type: IMPLANTABLE DEVICE | Site: RIGHT | Status: FUNCTIONAL

## 2025-04-16 DEVICE — ZIMMER FEMALE HEX SCREW MAGNETIC 2.5MM X 25MM: Type: IMPLANTABLE DEVICE | Site: RIGHT | Status: FUNCTIONAL

## 2025-04-16 DEVICE — IMPLANTABLE DEVICE: Type: IMPLANTABLE DEVICE | Site: RIGHT | Status: FUNCTIONAL

## 2025-04-16 DEVICE — STEM TIB PERSONA SZ E R 5 DEG: Type: IMPLANTABLE DEVICE | Site: RIGHT | Status: FUNCTIONAL

## 2025-04-16 DEVICE — ZIMMER/NEXGEN HEX HEAD SCREW 3.5MM: Type: IMPLANTABLE DEVICE | Site: RIGHT | Status: FUNCTIONAL

## 2025-04-16 DEVICE — STEM EXT PERSONA 14MM PLUS 30M: Type: IMPLANTABLE DEVICE | Site: RIGHT | Status: FUNCTIONAL

## 2025-04-16 DEVICE — FEM PERSONA PS CMT CCR STD SZ 5 R: Type: IMPLANTABLE DEVICE | Site: RIGHT | Status: FUNCTIONAL

## 2025-04-16 DEVICE — PATELLA VE 29MM: Type: IMPLANTABLE DEVICE | Site: RIGHT | Status: FUNCTIONAL

## 2025-04-16 DEVICE — ZIMMER/NEXGEN SMOOTH PIN 3.2X75MM: Type: IMPLANTABLE DEVICE | Site: RIGHT | Status: FUNCTIONAL

## 2025-04-16 RX ORDER — HYDROMORPHONE/SOD CHLOR,ISO/PF 2 MG/10 ML
0.5 SYRINGE (ML) INJECTION EVERY 4 HOURS
Refills: 0 | Status: DISCONTINUED | OUTPATIENT
Start: 2025-04-16 | End: 2025-04-17

## 2025-04-16 RX ORDER — ACETAMINOPHEN 500 MG/5ML
1000 LIQUID (ML) ORAL EVERY 8 HOURS
Refills: 0 | Status: DISCONTINUED | OUTPATIENT
Start: 2025-04-16 | End: 2025-04-17

## 2025-04-16 RX ORDER — ASPIRIN 325 MG
81 TABLET ORAL EVERY 12 HOURS
Refills: 0 | Status: DISCONTINUED | OUTPATIENT
Start: 2025-04-17 | End: 2025-04-17

## 2025-04-16 RX ORDER — ONDANSETRON HCL/PF 4 MG/2 ML
4 VIAL (ML) INJECTION EVERY 6 HOURS
Refills: 0 | Status: DISCONTINUED | OUTPATIENT
Start: 2025-04-16 | End: 2025-04-17

## 2025-04-16 RX ORDER — ATORVASTATIN CALCIUM 80 MG/1
1 TABLET, FILM COATED ORAL
Refills: 0 | DISCHARGE

## 2025-04-16 RX ORDER — POLYETHYLENE GLYCOL 3350 17 G/17G
17 POWDER, FOR SOLUTION ORAL AT BEDTIME
Refills: 0 | Status: DISCONTINUED | OUTPATIENT
Start: 2025-04-16 | End: 2025-04-17

## 2025-04-16 RX ORDER — MAGNESIUM HYDROXIDE 400 MG/5ML
30 SUSPENSION ORAL DAILY
Refills: 0 | Status: DISCONTINUED | OUTPATIENT
Start: 2025-04-16 | End: 2025-04-17

## 2025-04-16 RX ORDER — SENNA 187 MG
2 TABLET ORAL AT BEDTIME
Refills: 0 | Status: DISCONTINUED | OUTPATIENT
Start: 2025-04-16 | End: 2025-04-17

## 2025-04-16 RX ORDER — OXYCODONE HYDROCHLORIDE 30 MG/1
10 TABLET ORAL EVERY 4 HOURS
Refills: 0 | Status: DISCONTINUED | OUTPATIENT
Start: 2025-04-16 | End: 2025-04-17

## 2025-04-16 RX ORDER — ACETAMINOPHEN 500 MG/5ML
1000 LIQUID (ML) ORAL ONCE
Refills: 0 | Status: COMPLETED | OUTPATIENT
Start: 2025-04-16 | End: 2025-04-16

## 2025-04-16 RX ORDER — MELATONIN 5 MG
5 TABLET ORAL AT BEDTIME
Refills: 0 | Status: DISCONTINUED | OUTPATIENT
Start: 2025-04-16 | End: 2025-04-17

## 2025-04-16 RX ORDER — OXYCODONE HYDROCHLORIDE 30 MG/1
5 TABLET ORAL EVERY 6 HOURS
Refills: 0 | Status: DISCONTINUED | OUTPATIENT
Start: 2025-04-16 | End: 2025-04-16

## 2025-04-16 RX ORDER — SODIUM CHLORIDE 9 G/1000ML
1000 INJECTION, SOLUTION INTRAVENOUS
Refills: 0 | Status: DISCONTINUED | OUTPATIENT
Start: 2025-04-17 | End: 2025-04-17

## 2025-04-16 RX ORDER — APREPITANT 40 MG/1
40 CAPSULE ORAL ONCE
Refills: 0 | Status: COMPLETED | OUTPATIENT
Start: 2025-04-16 | End: 2025-04-16

## 2025-04-16 RX ORDER — CEFAZOLIN SODIUM IN 0.9 % NACL 3 G/100 ML
2000 INTRAVENOUS SOLUTION, PIGGYBACK (ML) INTRAVENOUS EVERY 8 HOURS
Refills: 0 | Status: COMPLETED | OUTPATIENT
Start: 2025-04-16 | End: 2025-04-16

## 2025-04-16 RX ORDER — HYDROMORPHONE/SOD CHLOR,ISO/PF 2 MG/10 ML
0.5 SYRINGE (ML) INJECTION
Refills: 0 | Status: DISCONTINUED | OUTPATIENT
Start: 2025-04-16 | End: 2025-04-17

## 2025-04-16 RX ORDER — ATORVASTATIN CALCIUM 80 MG/1
40 TABLET, FILM COATED ORAL AT BEDTIME
Refills: 0 | Status: DISCONTINUED | OUTPATIENT
Start: 2025-04-16 | End: 2025-04-17

## 2025-04-16 RX ORDER — SODIUM CHLORIDE 9 G/1000ML
1000 INJECTION, SOLUTION INTRAVENOUS
Refills: 0 | Status: DISCONTINUED | OUTPATIENT
Start: 2025-04-16 | End: 2025-04-16

## 2025-04-16 RX ORDER — OXYCODONE HYDROCHLORIDE 30 MG/1
5 TABLET ORAL EVERY 4 HOURS
Refills: 0 | Status: DISCONTINUED | OUTPATIENT
Start: 2025-04-16 | End: 2025-04-17

## 2025-04-16 RX ORDER — OXYCODONE HYDROCHLORIDE 30 MG/1
10 TABLET ORAL EVERY 6 HOURS
Refills: 0 | Status: DISCONTINUED | OUTPATIENT
Start: 2025-04-16 | End: 2025-04-16

## 2025-04-16 RX ADMIN — Medication 0.5 MILLIGRAM(S): at 11:34

## 2025-04-16 RX ADMIN — Medication 1000 MILLIGRAM(S): at 06:23

## 2025-04-16 RX ADMIN — Medication 1000 MILLIGRAM(S): at 21:00

## 2025-04-16 RX ADMIN — Medication 100 MILLIGRAM(S): at 15:48

## 2025-04-16 RX ADMIN — Medication 2 TABLET(S): at 21:01

## 2025-04-16 RX ADMIN — POLYETHYLENE GLYCOL 3350 17 GRAM(S): 17 POWDER, FOR SOLUTION ORAL at 21:01

## 2025-04-16 RX ADMIN — Medication 1 APPLICATION(S): at 06:23

## 2025-04-16 RX ADMIN — OXYCODONE HYDROCHLORIDE 5 MILLIGRAM(S): 30 TABLET ORAL at 23:56

## 2025-04-16 RX ADMIN — OXYCODONE HYDROCHLORIDE 5 MILLIGRAM(S): 30 TABLET ORAL at 17:42

## 2025-04-16 RX ADMIN — APREPITANT 40 MILLIGRAM(S): 40 CAPSULE ORAL at 06:23

## 2025-04-16 RX ADMIN — Medication 0.5 MILLIGRAM(S): at 11:29

## 2025-04-16 RX ADMIN — Medication 5 MILLIGRAM(S): at 23:55

## 2025-04-16 RX ADMIN — Medication 0.5 MILLIGRAM(S): at 10:29

## 2025-04-16 RX ADMIN — Medication 1 APPLICATION(S): at 06:24

## 2025-04-16 RX ADMIN — Medication 40 MILLIGRAM(S): at 12:23

## 2025-04-16 RX ADMIN — Medication 0.5 MILLIGRAM(S): at 11:14

## 2025-04-16 RX ADMIN — ATORVASTATIN CALCIUM 40 MILLIGRAM(S): 80 TABLET, FILM COATED ORAL at 21:00

## 2025-04-16 RX ADMIN — OXYCODONE HYDROCHLORIDE 5 MILLIGRAM(S): 30 TABLET ORAL at 16:42

## 2025-04-16 RX ADMIN — Medication 1000 MILLIGRAM(S): at 13:28

## 2025-04-16 RX ADMIN — Medication 100 MILLIGRAM(S): at 23:56

## 2025-04-16 NOTE — PROGRESS NOTE ADULT - ASSESSMENT
75F with PMH od HLD, GERD, Scoliosis, presente dto [facility] with one year of right knee pain. Patients daughter and brother were at bedside. patient states had some pain post operatively, improved on pain management, described as sharp around right knee, non radiating, controlled though at 2-3/10. Patient denies CP Palpitations N/V / SOB / HA.   pain management, oxycodone / dilaudid / tylenol    DVT ppx, PRN anti emetics, advance diet as tolerated  GERD- famotidine daily  HLD: continue on statin

## 2025-04-16 NOTE — PRE-ANESTHESIA EVALUATION ADULT - NSANTHADDINFOFT_GEN_ALL_CORE
Spinal / PNB for postop pain  R/B/A d/w patient including risks of parasthesia, HA, and nerve injury. All questions answered.

## 2025-04-16 NOTE — PROGRESS NOTE ADULT - SUBJECTIVE AND OBJECTIVE BOX
HPI:   75F with PMH od HLD, GERD, Scoliosis, presente dto [facility] with one year of right knee pain. Patients daughter and brother were at bedside. patient states had some pain post operatively, improved on pain management, described as sharp around right knee, non radiating, controlled though at 2-3/10. Patient denies CP Palpitations N/V / SOB / HA.     ROS: All 12 systems reviewed and negative except for HPI         PAST MEDICAL & SURGICAL HISTORY:  GERD (gastroesophageal reflux disease)      Scoliosis      HLD (hyperlipidemia)      History of tonsillectomy      H/O left knee surgery      H/O colonoscopy      H/O cataract extraction  B/L          Allergies    No Known Allergies    Intolerances        MEDICATIONS  (STANDING):  acetaminophen     Tablet .. 1000 milliGRAM(s) Oral every 8 hours  aspirin enteric coated 81 milliGRAM(s) Oral every 12 hours  atorvastatin 40 milliGRAM(s) Oral at bedtime  famotidine    Tablet 40 milliGRAM(s) Oral daily  lactated ringers. 1000 milliLiter(s) (100 mL/Hr) IV Continuous <Continuous>  polyethylene glycol 3350 17 Gram(s) Oral at bedtime  senna 2 Tablet(s) Oral at bedtime    MEDICATIONS  (PRN):  HYDROmorphone  Injectable 0.5 milliGRAM(s) IV Push every 4 hours PRN Breakthrough pain  HYDROmorphone  Injectable 0.5 milliGRAM(s) IV Push every 15 minutes PRN Breakthrough pain  magnesium hydroxide Suspension 30 milliLiter(s) Oral daily PRN Constipation  melatonin 5 milliGRAM(s) Oral at bedtime PRN Sleep  ondansetron Injectable 4 milliGRAM(s) IV Push every 6 hours PRN Nausea and/or Vomiting  oxyCODONE    IR 5 milliGRAM(s) Oral every 4 hours PRN Moderate Pain (4 - 6)  oxyCODONE    IR 10 milliGRAM(s) Oral every 4 hours PRN Severe Pain (7 - 10)      SOCIAL HISTORY:    FAMILY HISTORY:        Vital Signs Last 24 Hrs  T(C): 36.9 (17 Apr 2025 08:25), Max: 37.7 (17 Apr 2025 05:15)  T(F): 98.5 (17 Apr 2025 08:25), Max: 99.9 (17 Apr 2025 05:15)  HR: 90 (17 Apr 2025 08:25) (63 - 90)  BP: 114/74 (17 Apr 2025 08:25) (100/56 - 114/74)  BP(mean): --  RR: 17 (17 Apr 2025 08:25) (16 - 17)  SpO2: 97% (17 Apr 2025 08:25) (94% - 98%)    Parameters below as of 17 Apr 2025 08:25  Patient On (Oxygen Delivery Method): room air        I&O's Summary      LABS:                        10.7   5.68  )-----------( 174      ( 17 Apr 2025 07:47 )             31.8     04-17    136  |  103  |  14  ----------------------------<  99  3.6   |  23  |  0.68    Ca    8.4      17 Apr 2025 07:47          Urinalysis Basic - ( 17 Apr 2025 07:47 )    Color: x / Appearance: x / SG: x / pH: x  Gluc: 99 mg/dL / Ketone: x  / Bili: x / Urobili: x   Blood: x / Protein: x / Nitrite: x   Leuk Esterase: x / RBC: x / WBC x   Sq Epi: x / Non Sq Epi: x / Bacteria: x      CAPILLARY BLOOD GLUCOSE          Cultures:      PHYSICAL EXAM:  General: NAD, resting comfortably  HEENT: NC/AT, EOMI, normal hearing, no oral lesions, no LAD, neck supple  Pulmonary: Normal resp effort, CTA-B  Cardiovascular: NSR, no murmurs  Abdominal: Soft, ND/NT, no organomegaly  Extremities: (+) DP/PT pulses. s/p R TKA   Neuro:  CNs II-XII grossly intact, normal sensation, no focal deficits  Pulses: Palpable distal pulses    RADIOLOGY & ADDITIONAL STUDIES:      ASSESSMENT:      PLAN:         CONSULT NOTE INITIAL       HPI:   75F with PMH od HLD, GERD, Scoliosis, [facility]e dto [facility] with one year of right knee pain. Patients daughter and brother were at bedside. patient states had some pain post operatively, improved on pain management, described as sharp around right knee, non radiating, controlled though at 2-3/10. Patient denies CP Palpitations N/V / SOB / HA.     ROS: All 12 systems reviewed and negative except for HPI         PAST MEDICAL & SURGICAL HISTORY:  GERD (gastroesophageal reflux disease)      Scoliosis      HLD (hyperlipidemia)      History of tonsillectomy      H/O left knee surgery      H/O colonoscopy      H/O cataract extraction  B/L          Allergies    No Known Allergies    Intolerances        MEDICATIONS  (STANDING):  acetaminophen     Tablet .. 1000 milliGRAM(s) Oral every 8 hours  aspirin enteric coated 81 milliGRAM(s) Oral every 12 hours  atorvastatin 40 milliGRAM(s) Oral at bedtime  famotidine    Tablet 40 milliGRAM(s) Oral daily  lactated ringers. 1000 milliLiter(s) (100 mL/Hr) IV Continuous <Continuous>  polyethylene glycol 3350 17 Gram(s) Oral at bedtime  senna 2 Tablet(s) Oral at bedtime    MEDICATIONS  (PRN):  HYDROmorphone  Injectable 0.5 milliGRAM(s) IV Push every 4 hours PRN Breakthrough pain  HYDROmorphone  Injectable 0.5 milliGRAM(s) IV Push every 15 minutes PRN Breakthrough pain  magnesium hydroxide Suspension 30 milliLiter(s) Oral daily PRN Constipation  melatonin 5 milliGRAM(s) Oral at bedtime PRN Sleep  ondansetron Injectable 4 milliGRAM(s) IV Push every 6 hours PRN Nausea and/or Vomiting  oxyCODONE    IR 5 milliGRAM(s) Oral every 4 hours PRN Moderate Pain (4 - 6)  oxyCODONE    IR 10 milliGRAM(s) Oral every 4 hours PRN Severe Pain (7 - 10)      SOCIAL HISTORY:    FAMILY HISTORY:        Vital Signs Last 24 Hrs  T(C): 36.9 (17 Apr 2025 08:25), Max: 37.7 (17 Apr 2025 05:15)  T(F): 98.5 (17 Apr 2025 08:25), Max: 99.9 (17 Apr 2025 05:15)  HR: 90 (17 Apr 2025 08:25) (63 - 90)  BP: 114/74 (17 Apr 2025 08:25) (100/56 - 114/74)  BP(mean): --  RR: 17 (17 Apr 2025 08:25) (16 - 17)  SpO2: 97% (17 Apr 2025 08:25) (94% - 98%)    Parameters below as of 17 Apr 2025 08:25  Patient On (Oxygen Delivery Method): room air        I&O's Summary      LABS:                        10.7   5.68  )-----------( 174      ( 17 Apr 2025 07:47 )             31.8     04-17    136  |  103  |  14  ----------------------------<  99  3.6   |  23  |  0.68    Ca    8.4      17 Apr 2025 07:47          Urinalysis Basic - ( 17 Apr 2025 07:47 )    Color: x / Appearance: x / SG: x / pH: x  Gluc: 99 mg/dL / Ketone: x  / Bili: x / Urobili: x   Blood: x / Protein: x / Nitrite: x   Leuk Esterase: x / RBC: x / WBC x   Sq Epi: x / Non Sq Epi: x / Bacteria: x      CAPILLARY BLOOD GLUCOSE          Cultures:      PHYSICAL EXAM:  General: NAD, resting comfortably  HEENT: NC/AT, EOMI, normal hearing, no oral lesions, no LAD, neck supple  Pulmonary: Normal resp effort, CTA-B  Cardiovascular: NSR, no murmurs  Abdominal: Soft, ND/NT, no organomegaly  Extremities: (+) DP/PT pulses. s/p R TKA   Neuro:  CNs II-XII grossly intact, normal sensation, no focal deficits  Pulses: Palpable distal pulses    RADIOLOGY & ADDITIONAL STUDIES:      ASSESSMENT:      PLAN:         CONSULT NOTE INITIAL       HPI:   75F with PMH od HLD, GERD, Scoliosis, presente dto [facility] with one year of right knee pain. Patients daughter and brother were at bedside. patient states had some pain post operatively, improved on pain management, described as sharp around right knee, non radiating, controlled though at 2-3/10. Patient denies CP Palpitations N/V / SOB / HA.     ROS: All 12 systems reviewed and negative except for HPI         PAST MEDICAL & SURGICAL HISTORY:  GERD (gastroesophageal reflux disease)      Scoliosis      HLD (hyperlipidemia)      History of tonsillectomy      H/O left knee surgery      H/O colonoscopy      H/O cataract extraction  B/L          Allergies    No Known Allergies    Intolerances        MEDICATIONS  (STANDING):  acetaminophen     Tablet .. 1000 milliGRAM(s) Oral every 8 hours  aspirin enteric coated 81 milliGRAM(s) Oral every 12 hours  atorvastatin 40 milliGRAM(s) Oral at bedtime  famotidine    Tablet 40 milliGRAM(s) Oral daily  lactated ringers. 1000 milliLiter(s) (100 mL/Hr) IV Continuous <Continuous>  polyethylene glycol 3350 17 Gram(s) Oral at bedtime  senna 2 Tablet(s) Oral at bedtime    MEDICATIONS  (PRN):  HYDROmorphone  Injectable 0.5 milliGRAM(s) IV Push every 4 hours PRN Breakthrough pain  HYDROmorphone  Injectable 0.5 milliGRAM(s) IV Push every 15 minutes PRN Breakthrough pain  magnesium hydroxide Suspension 30 milliLiter(s) Oral daily PRN Constipation  melatonin 5 milliGRAM(s) Oral at bedtime PRN Sleep  ondansetron Injectable 4 milliGRAM(s) IV Push every 6 hours PRN Nausea and/or Vomiting  oxyCODONE    IR 5 milliGRAM(s) Oral every 4 hours PRN Moderate Pain (4 - 6)  oxyCODONE    IR 10 milliGRAM(s) Oral every 4 hours PRN Severe Pain (7 - 10)      SOCIAL HISTORY:    FAMILY HISTORY:        Vital Signs Last 24 Hrs  T(C): 36.9 (04-16-25 @ 12:26), Max: 36.9 (04-16-25 @ 12:26)  T(F): 98.5 (04-16-25 @ 12:26), Max: 98.5 (04-16-25 @ 12:26)  HR: 63 (04-16-25 @ 12:26) (58 - 94)  BP: 113/65 (04-16-25 @ 12:26) (104/55 - 129/72)  BP(mean): 88 (04-16-25 @ 11:36) (73 - 97)  RR: 16 (04-16-25 @ 12:26) (13 - 19)  SpO2: 94% (04-16-25 @ 12:26) (92% - 100%)  I&O's Summary        I&O's Summary      LABS:                        10.7   5.68  )-----------( 174      ( 17 Apr 2025 07:47 )             31.8     04-17    136  |  103  |  14  ----------------------------<  99  3.6   |  23  |  0.68    Ca    8.4      17 Apr 2025 07:47          Urinalysis Basic - ( 17 Apr 2025 07:47 )    Color: x / Appearance: x / SG: x / pH: x  Gluc: 99 mg/dL / Ketone: x  / Bili: x / Urobili: x   Blood: x / Protein: x / Nitrite: x   Leuk Esterase: x / RBC: x / WBC x   Sq Epi: x / Non Sq Epi: x / Bacteria: x      CAPILLARY BLOOD GLUCOSE          Cultures:      PHYSICAL EXAM:  General: NAD, resting comfortably  HEENT: NC/AT, EOMI, normal hearing, no oral lesions, no LAD, neck supple  Pulmonary: Normal resp effort, CTA-B  Cardiovascular: NSR, no murmurs  Abdominal: Soft, ND/NT, no organomegaly  Extremities: (+) DP/PT pulses. s/p R TKA   Neuro:  CNs II-XII grossly intact, normal sensation, no focal deficits  Pulses: Palpable distal pulses    RADIOLOGY & ADDITIONAL STUDIES:      ASSESSMENT:      PLAN:

## 2025-04-16 NOTE — PROGRESS NOTE ADULT - SUBJECTIVE AND OBJECTIVE BOX
Ortho Post Op Check    Procedure: Right total knee replacement  Surgeon: Dr. Simpson    Pt seen and examined at bedside post operatively after physical therapy session. Patient reports feeling well, comfortable without complaints, pain controlled  Denies CP, SOB, N/V, numbness/tingling, dizziness.     Vital Signs Last 24 Hrs  T(C): 36.9 (04-16-25 @ 12:26), Max: 36.9 (04-16-25 @ 12:26)  T(F): 98.5 (04-16-25 @ 12:26), Max: 98.5 (04-16-25 @ 12:26)  HR: 63 (04-16-25 @ 12:26) (58 - 94)  BP: 113/65 (04-16-25 @ 12:26) (104/55 - 129/72)  BP(mean): 88 (04-16-25 @ 11:36) (73 - 97)  RR: 16 (04-16-25 @ 12:26) (13 - 19)  SpO2: 94% (04-16-25 @ 12:26) (92% - 100%)  I&O's Summary      General: Pt Alert and oriented, NAD  DSG C/D/I- Ace wrap right knee  Pulses: 2+ DP bilaterally, brisk cap refill  Sensation: SILT distally bilateral lower extremities  Motor: EHL/FHL/TA/GS: 5/5 bilaterally        Post-op X-Ray:  Right knee XR:   FINDINGS/  IMPRESSION:  Patient is status post right total knee arthroplasty with postsurgical   changes in the overlying soft tissues. Hardware is intact. No fracture.    --- End of Report ---      A/P: 75yFemale POD#0 s/p right total knee replacement with Dr. Simpson  - Stable  - Pain Control  - DVT ppx: Aspirin 81mg po twice a day  - Post op abx: Ancef  - PT, WBS: WBAT  - Dispo: pending PT eval    Ortho Pager [phone]

## 2025-04-16 NOTE — PATIENT PROFILE ADULT - FALL HARM RISK - HARM RISK INTERVENTIONS
Assistance with ambulation/Assistance OOB with selected safe patient handling equipment/Communicate Risk of Fall with Harm to all staff/Discuss with provider need for PT consult/Monitor gait and stability/Provide patient with walking aids - walker, cane, crutches/Reinforce activity limits and safety measures with patient and family/Sit up slowly, dangle for a short time, stand at bedside before walking/Tailored Fall Risk Interventions/Use of alarms - bed, chair and/or voice tab/Visual Cue: Yellow wristband and red socks/Bed in lowest position, wheels locked, appropriate side rails in place/Call bell, personal items and telephone in reach/Instruct patient to call for assistance before getting out of bed or chair/Non-slip footwear when patient is out of bed/[place] to call system/Physically safe environment - no spills, clutter or unnecessary equipment/Purposeful Proactive Rounding/Room/bathroom lighting operational, light cord in reach

## 2025-04-16 NOTE — PHYSICAL THERAPY INITIAL EVALUATION ADULT - PERTINENT HX OF CURRENT PROBLEM, REHAB EVAL
75F with right knee pain x 1 year. Denies numbness or tingling into bilateral lower extremities. Denies taking any medication for pain. Patient has tried and failed conservative treatment of right knee pain.

## 2025-04-17 ENCOUNTER — TRANSCRIPTION ENCOUNTER (OUTPATIENT)
Age: 75
End: 2025-04-17

## 2025-04-17 VITALS
HEART RATE: 90 BPM | DIASTOLIC BLOOD PRESSURE: 74 MMHG | SYSTOLIC BLOOD PRESSURE: 114 MMHG | OXYGEN SATURATION: 97 % | TEMPERATURE: 98 F | RESPIRATION RATE: 17 BRPM

## 2025-04-17 LAB
ANION GAP SERPL CALC-SCNC: 10 MMOL/L — SIGNIFICANT CHANGE UP (ref 5–17)
BUN SERPL-MCNC: 14 MG/DL — SIGNIFICANT CHANGE UP (ref 7–23)
CALCIUM SERPL-MCNC: 8.4 MG/DL — SIGNIFICANT CHANGE UP (ref 8.4–10.5)
CHLORIDE SERPL-SCNC: 103 MMOL/L — SIGNIFICANT CHANGE UP (ref 96–108)
CO2 SERPL-SCNC: 23 MMOL/L — SIGNIFICANT CHANGE UP (ref 22–31)
CREAT SERPL-MCNC: 0.68 MG/DL — SIGNIFICANT CHANGE UP (ref 0.5–1.3)
EGFR: 91 ML/MIN/1.73M2 — SIGNIFICANT CHANGE UP
EGFR: 91 ML/MIN/1.73M2 — SIGNIFICANT CHANGE UP
GLUCOSE SERPL-MCNC: 99 MG/DL — SIGNIFICANT CHANGE UP (ref 70–99)
HCT VFR BLD CALC: 31.8 % — LOW (ref 34.5–45)
HGB BLD-MCNC: 10.7 G/DL — LOW (ref 11.5–15.5)
MCHC RBC-ENTMCNC: 30.2 PG — SIGNIFICANT CHANGE UP (ref 27–34)
MCHC RBC-ENTMCNC: 33.6 G/DL — SIGNIFICANT CHANGE UP (ref 32–36)
MCV RBC AUTO: 89.8 FL — SIGNIFICANT CHANGE UP (ref 80–100)
NRBC BLD AUTO-RTO: 0 /100 WBCS — SIGNIFICANT CHANGE UP (ref 0–0)
PLATELET # BLD AUTO: 174 K/UL — SIGNIFICANT CHANGE UP (ref 150–400)
POTASSIUM SERPL-MCNC: 3.6 MMOL/L — SIGNIFICANT CHANGE UP (ref 3.5–5.3)
POTASSIUM SERPL-SCNC: 3.6 MMOL/L — SIGNIFICANT CHANGE UP (ref 3.5–5.3)
RBC # BLD: 3.54 M/UL — LOW (ref 3.8–5.2)
RBC # FLD: 13.3 % — SIGNIFICANT CHANGE UP (ref 10.3–14.5)
SODIUM SERPL-SCNC: 136 MMOL/L — SIGNIFICANT CHANGE UP (ref 135–145)
WBC # BLD: 5.68 K/UL — SIGNIFICANT CHANGE UP (ref 3.8–10.5)
WBC # FLD AUTO: 5.68 K/UL — SIGNIFICANT CHANGE UP (ref 3.8–10.5)

## 2025-04-17 PROCEDURE — 80048 BASIC METABOLIC PNL TOTAL CA: CPT

## 2025-04-17 PROCEDURE — C1776: CPT

## 2025-04-17 PROCEDURE — C1713: CPT

## 2025-04-17 PROCEDURE — 97116 GAIT TRAINING THERAPY: CPT

## 2025-04-17 PROCEDURE — 97161 PT EVAL LOW COMPLEX 20 MIN: CPT

## 2025-04-17 PROCEDURE — 85027 COMPLETE CBC AUTOMATED: CPT

## 2025-04-17 PROCEDURE — C1889: CPT

## 2025-04-17 PROCEDURE — 73560 X-RAY EXAM OF KNEE 1 OR 2: CPT

## 2025-04-17 RX ORDER — ASPIRIN 325 MG
1 TABLET ORAL
Qty: 60 | Refills: 0
Start: 2025-04-17

## 2025-04-17 RX ORDER — OXYCODONE HYDROCHLORIDE 30 MG/1
1 TABLET ORAL
Qty: 30 | Refills: 0
Start: 2025-04-17

## 2025-04-17 RX ORDER — ACETAMINOPHEN 500 MG/5ML
2 LIQUID (ML) ORAL
Qty: 0 | Refills: 0 | DISCHARGE
Start: 2025-04-17

## 2025-04-17 RX ADMIN — Medication 0.5 MILLIGRAM(S): at 08:52

## 2025-04-17 RX ADMIN — Medication 0.5 MILLIGRAM(S): at 08:37

## 2025-04-17 RX ADMIN — Medication 40 MILLIGRAM(S): at 11:28

## 2025-04-17 RX ADMIN — Medication 81 MILLIGRAM(S): at 05:05

## 2025-04-17 RX ADMIN — OXYCODONE HYDROCHLORIDE 5 MILLIGRAM(S): 30 TABLET ORAL at 07:15

## 2025-04-17 RX ADMIN — OXYCODONE HYDROCHLORIDE 10 MILLIGRAM(S): 30 TABLET ORAL at 11:28

## 2025-04-17 RX ADMIN — OXYCODONE HYDROCHLORIDE 5 MILLIGRAM(S): 30 TABLET ORAL at 08:15

## 2025-04-17 RX ADMIN — OXYCODONE HYDROCHLORIDE 5 MILLIGRAM(S): 30 TABLET ORAL at 00:56

## 2025-04-17 RX ADMIN — Medication 1000 MILLIGRAM(S): at 05:05

## 2025-04-17 RX ADMIN — OXYCODONE HYDROCHLORIDE 10 MILLIGRAM(S): 30 TABLET ORAL at 12:28

## 2025-04-17 NOTE — DISCHARGE NOTE PROVIDER - HOSPITAL COURSE
Admitted 4/16/25  Surgery - right total knee replacement  Pain control  Perioperative Antibiotics  DVT prophylaxis  Optimized Physical Therapy and out of bed to chair  Pain Regimen given as needed  Medicine Comanagement    You were admitted to the orthopedic service, optimized by medicine for discharge. Labs and vitals were monitored daily. You worked with physical therapy after surgery daily.

## 2025-04-17 NOTE — DISCHARGE NOTE PROVIDER - CARE PROVIDER_API CALL
Luis Simpson  Orthopaedic Surgery  [address]  [place] [address]  Phone: [phone]  Fax: [phone]  Follow Up Time: 2 weeks

## 2025-04-17 NOTE — DISCHARGE NOTE PROVIDER - NSDCCPCAREPLAN_GEN_ALL_CORE_FT
PRINCIPAL DISCHARGE DIAGNOSIS  Diagnosis: Osteoarthritis  Assessment and Plan of Treatment: Right total knee replacement

## 2025-04-17 NOTE — DISCHARGE NOTE PROVIDER - NSDCMRMEDTOKEN_GEN_ALL_CORE_FT
atorvastatin 40 mg oral tablet: 1 tab(s) orally once a day  famotidine 40 mg oral tablet: 1 tab(s) orally once a day  meloxicam 15 mg oral tablet: 1 tab(s) orally once a day   acetaminophen 500 mg oral tablet: 2 tab(s) orally every 8 hours  aspirin 81 mg oral delayed release tablet: 1 tab(s) orally every 12 hours MDD: 2  atorvastatin 40 mg oral tablet: 1 tab(s) orally once a day  famotidine 40 mg oral tablet: 1 tab(s) orally once a day  meloxicam 15 mg oral tablet: 1 tab(s) orally once a day  oxyCODONE 5 mg oral tablet: 1 tab(s) orally every 6 hours as needed for Moderate Pain (4 - 6) 1-2 tabs every 6 hours as needed for pain MDD: 6   acetaminophen 500 mg oral tablet: 2 tab(s) orally every 8 hours  aspirin 81 mg oral delayed release tablet: 1 tab(s) orally every 12 hours MDD: 2  atorvastatin 40 mg oral tablet: 1 tab(s) orally once a day  famotidine 40 mg oral tablet: 1 tab(s) orally once a day  oxyCODONE 5 mg oral tablet: 1 tab(s) orally every 6 hours as needed for Moderate Pain (4 - 6) 1-2 tabs every 6 hours as needed for pain MDD: 6

## 2025-04-17 NOTE — PROGRESS NOTE ADULT - SUBJECTIVE AND OBJECTIVE BOX
CONSULT NOTE INITIAL       HPI:   75F with PMH od HLD, GERD, Scoliosis, presente dto [facility] with one year of right knee pain. Patients daughter and brother were at bedside. patient states had some pain post operatively, improved on pain management, described as sharp around right knee, non radiating, controlled though at 2-3/10. Patient denies CP Palpitations N/V / SOB / HA.     ROS: All 12 systems reviewed and negative except for HPI   pain regiment was held overnight due to soft BPs   patient recieved dilaudid today  discussed discharge regiment of oxycodone with patient and ortho team       PAST MEDICAL & SURGICAL HISTORY:  GERD (gastroesophageal reflux disease)      Scoliosis      HLD (hyperlipidemia)      History of tonsillectomy      H/O left knee surgery      H/O colonoscopy      H/O cataract extraction  B/L          Allergies    No Known Allergies    Intolerances        MEDICATIONS  (STANDING):  acetaminophen     Tablet .. 1000 milliGRAM(s) Oral every 8 hours  aspirin enteric coated 81 milliGRAM(s) Oral every 12 hours  atorvastatin 40 milliGRAM(s) Oral at bedtime  famotidine    Tablet 40 milliGRAM(s) Oral daily  lactated ringers. 1000 milliLiter(s) (100 mL/Hr) IV Continuous <Continuous>  polyethylene glycol 3350 17 Gram(s) Oral at bedtime  senna 2 Tablet(s) Oral at bedtime    MEDICATIONS  (PRN):  HYDROmorphone  Injectable 0.5 milliGRAM(s) IV Push every 4 hours PRN Breakthrough pain  HYDROmorphone  Injectable 0.5 milliGRAM(s) IV Push every 15 minutes PRN Breakthrough pain  magnesium hydroxide Suspension 30 milliLiter(s) Oral daily PRN Constipation  melatonin 5 milliGRAM(s) Oral at bedtime PRN Sleep  ondansetron Injectable 4 milliGRAM(s) IV Push every 6 hours PRN Nausea and/or Vomiting  oxyCODONE    IR 5 milliGRAM(s) Oral every 4 hours PRN Moderate Pain (4 - 6)  oxyCODONE    IR 10 milliGRAM(s) Oral every 4 hours PRN Severe Pain (7 - 10)      SOCIAL HISTORY:    FAMILY HISTORY:        Vital Signs Last 24 Hrs  T(C): 36.9 (17 Apr 2025 08:25), Max: 37.7 (17 Apr 2025 05:15)  T(F): 98.5 (17 Apr 2025 08:25), Max: 99.9 (17 Apr 2025 05:15)  HR: 90 (17 Apr 2025 08:25) (63 - 90)  BP: 114/74 (17 Apr 2025 08:25) (100/56 - 114/74)  BP(mean): --  RR: 17 (17 Apr 2025 08:25) (16 - 17)  SpO2: 97% (17 Apr 2025 08:25) (94% - 98%)    Parameters below as of 17 Apr 2025 08:25  Patient On (Oxygen Delivery Method): room air        I&O's Summary      LABS:                        10.7   5.68  )-----------( 174      ( 17 Apr 2025 07:47 )             31.8     04-17    136  |  103  |  14  ----------------------------<  99  3.6   |  23  |  0.68    Ca    8.4      17 Apr 2025 07:47          Urinalysis Basic - ( 17 Apr 2025 07:47 )    Color: x / Appearance: x / SG: x / pH: x  Gluc: 99 mg/dL / Ketone: x  / Bili: x / Urobili: x   Blood: x / Protein: x / Nitrite: x   Leuk Esterase: x / RBC: x / WBC x   Sq Epi: x / Non Sq Epi: x / Bacteria: x      CAPILLARY BLOOD GLUCOSE          Cultures:      PHYSICAL EXAM:  General: NAD, resting comfortably  HEENT: NC/AT, EOMI, normal hearing, no oral lesions, no LAD, neck supple  Pulmonary: Normal resp effort, CTA-B  Cardiovascular: NSR, no murmurs  Abdominal: Soft, ND/NT, no organomegaly  Extremities: (+) DP/PT pulses. s/p R TKA   Neuro:  CNs II-XII grossly intact, normal sensation, no focal deficits  Pulses: Palpable distal pulses    RADIOLOGY & ADDITIONAL STUDIES:      ASSESSMENT:      PLAN:

## 2025-04-17 NOTE — PROGRESS NOTE ADULT - ASSESSMENT
75F with PMH od HLD, GERD, Scoliosis, presente dto [facility] with one year of right knee pain. Patients daughter and brother were at bedside. patient states had some pain post operatively, improved on pain management, described as sharp around right knee, non radiating, controlled though at 2-3/10. Patient denies CP Palpitations N/V / SOB / HA.   pain management, oxycodone / dilaudid / tylenol    DVT ppx, PRN anti emetics, advance diet as tolerated  GERD- famotidine daily  HLD: continue on statin

## 2025-04-17 NOTE — DISCHARGE NOTE PROVIDER - NSDCCPTREATMENT_GEN_ALL_CORE_FT
PRINCIPAL PROCEDURE  Procedure: Right total knee replacement  Findings and Treatment: Right knee osteoarthritis

## 2025-04-17 NOTE — DISCHARGE NOTE PROVIDER - NSDCACTIVITY_GEN_ALL_CORE
No restrictions/Do not drive or operate machinery/No heavy lifting/straining/Follow Instructions Provided by your Surgical Team/Activity as tolerated

## 2025-04-17 NOTE — DISCHARGE NOTE PROVIDER - NSDCFUADDINST_GEN_ALL_CORE_FT
ACTIVITY:   - Weight bear as tolerated with assistive device. No strenuous activity, heavy lifting, driving or returning to work until cleared by MD.   - Apply a cold compress to the surgical site several times daily to reduce pain and swelling. For icing, twenty-minute sessions followed by an hour off is recommended. You should ice as frequently as possible. Ice should NEVER be placed directly on the skin. Wearing compression stockings during the first week after surgery can help reduce swelling in your knee, calf and foot, but is not required.      (KNEE REPLACEMENTS)   DO place a pillow under your heel when elevating the leg, to encourage full extension of the knee. Do NOT place a pillow behind your knee for comfort, as this can lead to permanent difficulty straightening your leg. It is normal to develop some swelling in the leg, ankle, and foot because of gravity.     DRESSING/SHOWERING:   (AQUACEL – brown gel dressing)   - You may shower, your dressing is water-resistant. Do not soak in bathtubs. Remove dressing after postop day 7, then leave incision open to air. You may do this yourself (simply peel it off), or you may ask for assistance from your visiting nurse. Keep your incision clean and dry. Do not pick at your incision. Do not apply creams, ointments or oils to your incision until cleared by your surgeon. Do not soak your incision in sitting water (ie tubs, pools, lakes, etc.) until cleared by your surgeon. Do not scrub the incision – instead, allow soap and water to flow over the incision and then pat it dry with a clean towel.     MEDICATION/ANTICOAGULATION:   -You have been prescribed Aspirin 81 mg as a preventative to help prevent postoperative blood clots. Please take this medication as prescribed.    - You have been prescribed medications for pain:     - Tylenol for mild to moderate pain. Do not exceed 3,000mg daily.     - For more severe pain, take Tylenol with the addition of narcotic pain medication. Take this medication as prescribed. This medication may cause drowsiness or dizziness. Do not operate machinery. This medication may cause constipation.   - For any additional medications, follow instructions on the bottle.    -Try to have regular bowel movements. Take stool softener or laxative if necessary. You may wish to take Miralax daily until you have regular bowel movements.    - If you have been prescribed Aspirin or an anti-inflammatory, please take omeprazole (or similar) once a day, before breakfast, until no longer taking Aspirin or anti-inflammatory. This will help protect your stomach.   - If you have a pain management physician, please follow-up with them postoperatively.    - If you experience any negative side effects of your medications, please call your surgeon's office to discuss.      FOLLOW-UP:   - Call to schedule an appt with Dr. Simpson for follow up.   - Please follow-up with your primary care physician or any other specialist you see postoperatively, if needed.    - Contact your doctor or go to the emergency room if you experience: fever greater than 101.5, chills, chest pain, difficulty breathing, redness or excessive drainage around the incision, other concerns.

## 2025-04-20 DIAGNOSIS — M17.11 UNILATERAL PRIMARY OSTEOARTHRITIS, RIGHT KNEE: ICD-10-CM

## 2025-04-20 DIAGNOSIS — Z96.652 PRESENCE OF LEFT ARTIFICIAL KNEE JOINT: ICD-10-CM

## 2025-04-20 DIAGNOSIS — M41.9 SCOLIOSIS, UNSPECIFIED: ICD-10-CM

## 2025-04-20 DIAGNOSIS — Z90.89 ACQUIRED ABSENCE OF OTHER ORGANS: ICD-10-CM

## 2025-04-20 DIAGNOSIS — M25.761 OSTEOPHYTE, RIGHT KNEE: ICD-10-CM

## 2025-04-20 DIAGNOSIS — Z98.41 CATARACT EXTRACTION STATUS, RIGHT EYE: ICD-10-CM

## 2025-04-20 DIAGNOSIS — K21.9 GASTRO-ESOPHAGEAL REFLUX DISEASE WITHOUT ESOPHAGITIS: ICD-10-CM

## 2025-04-20 DIAGNOSIS — E11.9 TYPE 2 DIABETES MELLITUS WITHOUT COMPLICATIONS: ICD-10-CM

## 2025-04-20 DIAGNOSIS — Z98.42 CATARACT EXTRACTION STATUS, LEFT EYE: ICD-10-CM

## 2025-04-20 DIAGNOSIS — E78.5 HYPERLIPIDEMIA, UNSPECIFIED: ICD-10-CM

## 2025-06-20 ENCOUNTER — OFFICE (OUTPATIENT)
Dept: URBAN - METROPOLITAN AREA CLINIC 8 | Facility: CLINIC | Age: 75
Setting detail: OPHTHALMOLOGY
End: 2025-06-20
Payer: COMMERCIAL

## 2025-06-20 DIAGNOSIS — H16.223: ICD-10-CM

## 2025-06-20 DIAGNOSIS — H43.813: ICD-10-CM

## 2025-06-20 DIAGNOSIS — H26.493: ICD-10-CM

## 2025-06-20 DIAGNOSIS — H43.392: ICD-10-CM

## 2025-06-20 PROCEDURE — 92014 COMPRE OPH EXAM EST PT 1/>: CPT | Performed by: OPTOMETRIST

## 2025-06-20 ASSESSMENT — REFRACTION_MANIFEST
OD_ADD: +2.50
OD_SPHERE: +0.25
OS_CYLINDER: -0.50
OD_AXIS: 170
OS_CYLINDER: -0.50
OD_CYLINDER: -0.50
OS_SPHERE: -0.50
OU_VA: 20/20
OD_CYLINDER: -0.50
OS_AXIS: 30
OD_AXIS: 170
OD_VA2: 20/30(J2)
OD_VA2: 20/30(J2)
OU_VA: 20/20
OS_VA2: 20/30(J2)
OS_VA1: 20/20
OS_ADD: +2.75
OD_VA1: 20/20
OS_AXIS: 30
OD_VA1: 20/20
OD_SPHERE: +0.25
OS_SPHERE: -0.50
OS_VA1: 20/20
OS_VA2: 20/30(J2)
OD_ADD: +2.75
OS_ADD: +2.50

## 2025-06-20 ASSESSMENT — REFRACTION_CURRENTRX
OD_SPHERE: +2.50
OS_CYLINDER: -0.75
OS_SPHERE: -1.50
OD_VPRISM_DIRECTION: SV
OD_VPRISM_DIRECTION: SV
OD_SPHERE: +0.25
OS_VPRISM_DIRECTION: SV
OD_ADD: +2.50
OS_AXIS: 041
OS_CYLINDER: -0.75
OD_OVR_VA: 20/
OS_AXIS: 046
OD_CYLINDER: SPH
OS_OVR_VA: 20/
OS_VPRISM_DIRECTION: SV
OD_AXIS: 0
OD_VPRISM_DIRECTION: SV
OS_OVR_VA: 20/
OS_ADD: +2.50
OS_OVR_VA: 20/
OD_AXIS: 159
OD_OVR_VA: 20/
OD_OVR_VA: 20/
OD_CYLINDER: -0.50
OS_VPRISM_DIRECTION: SV
OS_SPHERE: +1.25

## 2025-06-20 ASSESSMENT — LID POSITION - DERMATOCHALASIS
OD_DERMATOCHALASIS: 1+
OS_DERMATOCHALASIS: 1+

## 2025-06-20 ASSESSMENT — KERATOMETRY
OS_K2POWER_DIOPTERS: 45.00
OD_AXISANGLE_DEGREES: 076
OS_AXISANGLE_DEGREES: 123
METHOD_AUTO_MANUAL: AUTO
OD_K1POWER_DIOPTERS: 44.75
OS_K1POWER_DIOPTERS: 44.75
OD_K2POWER_DIOPTERS: 45.00

## 2025-06-20 ASSESSMENT — LID POSITION - ECTROPION
OD_ECTROPION: RLL
OS_ECTROPION: LLL

## 2025-06-20 ASSESSMENT — REFRACTION_AUTOREFRACTION
OD_SPHERE: -0.25
OS_CYLINDER: -0.50
OS_SPHERE: -0.25
OS_AXIS: 041
OD_CYLINDER: 0.00
OD_AXIS: 000

## 2025-06-20 ASSESSMENT — LID POSITION - COMMENTS
OS_COMMENTS: PUNCTAL
OD_COMMENTS: PUNCTAL

## 2025-06-20 ASSESSMENT — VISUAL ACUITY
OS_BCVA: 20/25-2
OD_BCVA: 20/25-

## 2025-06-20 ASSESSMENT — CONFRONTATIONAL VISUAL FIELD TEST (CVF)
OD_FINDINGS: FULL
OS_FINDINGS: FULL

## (undated) DEVICE — POSITIONER FOAM EGG CRATE ULNAR 2PCS (PINK)

## (undated) DEVICE — PACK TOTAL KNEE

## (undated) DEVICE — VENODYNE/SCD SLEEVE CALF MEDIUM

## (undated) DEVICE — SAW BLADE STRYKER SAGITTAL 25X86.5X1.32MM

## (undated) DEVICE — SAW BLADE STRYKER SAGITTAL DUAL CUT TEETH 35X64X.64MM

## (undated) DEVICE — SUT STRATAFIX SPIRAL PDS PLUS 0 23X23CM CP-2 VIOLET

## (undated) DEVICE — DRAPE 3/4 SHEET 52X76"

## (undated) DEVICE — DRSG AQUACEL 3.5 X 10"

## (undated) DEVICE — SUT VICRYL 2-0 27" CT-1

## (undated) DEVICE — WARMING BLANKET UPPER ADULT

## (undated) DEVICE — NEPTUNE 4-PORT MANIFOLD W/ SPECIMEN COLLECTION

## (undated) DEVICE — WOUND IRR IRRISEPT W 0.5 CHG

## (undated) DEVICE — SUT VICRYL 0 36" CT-1 UNDYED

## (undated) DEVICE — SUT ETHILON 3-0 18" PS-1

## (undated) DEVICE — SUT STRATAFIX SPIRAL PDS PLUS 1 30X30CM OS-6 VIOLET

## (undated) DEVICE — GLV 8 PROTEXIS (WHITE)

## (undated) DEVICE — DRAPE 1/2 SHEET 40X57"

## (undated) DEVICE — STAPLER SKIN PROXIMATE

## (undated) DEVICE — SUT STRATAFIX SPIRAL MONOCRYL PLUS 3-0 30CM PS-1 UNDYED

## (undated) DEVICE — GLV 7.5 PROTEXIS (WHITE)

## (undated) DEVICE — ORTHOALIGN PLUS UNIT

## (undated) DEVICE — SAW BLADE STRYKER SAGITTAL 81.5X12.5X1.19MM

## (undated) DEVICE — HOOD T7 PLUS PEELAWAY

## (undated) DEVICE — SUT VICRYL PLUS 2-0 27" CT-1 UNDYED

## (undated) DEVICE — SUT STRATAFIX SYMMETRIC PDS 1 45CM OS-6

## (undated) DEVICE — DRSG WEBRIL 6"

## (undated) DEVICE — SUT ETHIBOND 1 30" OS8

## (undated) DEVICE — PREP BETADINE KIT

## (undated) DEVICE — PREP CHLORAPREP HI-LITE ORANGE 26ML

## (undated) DEVICE — DRSG ACE BANDAGE 6"

## (undated) DEVICE — DRSG COBAN 6"

## (undated) DEVICE — SPECIMEN CONTAINER 4OZ

## (undated) DEVICE — ELCTR STRYKER NEPTUNE SMOKE EVACUATION PENCIL (GREEN)